# Patient Record
Sex: MALE | Race: BLACK OR AFRICAN AMERICAN | NOT HISPANIC OR LATINO | Employment: OTHER | URBAN - METROPOLITAN AREA
[De-identification: names, ages, dates, MRNs, and addresses within clinical notes are randomized per-mention and may not be internally consistent; named-entity substitution may affect disease eponyms.]

---

## 2023-02-24 LAB
ANION GAP IN SER/PLAS: 15 MMOL/L (ref 10–20)
CALCIUM (MG/DL) IN SER/PLAS: 9.8 MG/DL (ref 8.6–10.6)
CARBON DIOXIDE, TOTAL (MMOL/L) IN SER/PLAS: 30 MMOL/L (ref 21–32)
CHLORIDE (MMOL/L) IN SER/PLAS: 101 MMOL/L (ref 98–107)
COBALAMIN (VITAMIN B12) (PG/ML) IN SER/PLAS: 632 PG/ML (ref 211–911)
CREATININE (MG/DL) IN SER/PLAS: 1.85 MG/DL (ref 0.5–1.3)
ERYTHROCYTE DISTRIBUTION WIDTH (RATIO) BY AUTOMATED COUNT: 14.3 % (ref 11.5–14.5)
ERYTHROCYTE MEAN CORPUSCULAR HEMOGLOBIN CONCENTRATION (G/DL) BY AUTOMATED: 32.3 G/DL (ref 32–36)
ERYTHROCYTE MEAN CORPUSCULAR VOLUME (FL) BY AUTOMATED COUNT: 89 FL (ref 80–100)
ERYTHROCYTES (10*6/UL) IN BLOOD BY AUTOMATED COUNT: 5.51 X10E12/L (ref 4.5–5.9)
GFR MALE: 36 ML/MIN/1.73M2
GLUCOSE (MG/DL) IN SER/PLAS: 68 MG/DL (ref 74–99)
HEMATOCRIT (%) IN BLOOD BY AUTOMATED COUNT: 48.9 % (ref 41–52)
HEMOGLOBIN (G/DL) IN BLOOD: 15.8 G/DL (ref 13.5–17.5)
LEUKOCYTES (10*3/UL) IN BLOOD BY AUTOMATED COUNT: 6.2 X10E9/L (ref 4.4–11.3)
NRBC (PER 100 WBCS) BY AUTOMATED COUNT: 0 /100 WBC (ref 0–0)
PLATELETS (10*3/UL) IN BLOOD AUTOMATED COUNT: 286 X10E9/L (ref 150–450)
POTASSIUM (MMOL/L) IN SER/PLAS: 4.6 MMOL/L (ref 3.5–5.3)
SODIUM (MMOL/L) IN SER/PLAS: 141 MMOL/L (ref 136–145)
THYROTROPIN (MIU/L) IN SER/PLAS BY DETECTION LIMIT <= 0.05 MIU/L: 1.03 MIU/L (ref 0.44–3.98)
UREA NITROGEN (MG/DL) IN SER/PLAS: 11 MG/DL (ref 6–23)

## 2023-03-09 PROBLEM — E55.9 VITAMIN D DEFICIENCY: Status: ACTIVE | Noted: 2023-03-09

## 2023-03-09 PROBLEM — R53.1 WEAKNESS GENERALIZED: Status: ACTIVE | Noted: 2023-03-09

## 2023-03-09 PROBLEM — H40.009 GLAUCOMA SUSPECT: Status: ACTIVE | Noted: 2023-03-09

## 2023-03-09 PROBLEM — J34.89 NASAL OBSTRUCTION: Status: ACTIVE | Noted: 2023-03-09

## 2023-03-09 PROBLEM — F10.27 DEMENTIA ASSOCIATED WITH ALCOHOLISM WITHOUT BEHAVIORAL DISTURBANCE (MULTI): Status: ACTIVE | Noted: 2023-03-09

## 2023-03-09 PROBLEM — E53.8 VITAMIN B12 DEFICIENCY: Status: ACTIVE | Noted: 2023-03-09

## 2023-03-09 PROBLEM — N18.30 CKD (CHRONIC KIDNEY DISEASE) STAGE 3, GFR 30-59 ML/MIN (MULTI): Status: ACTIVE | Noted: 2023-03-09

## 2023-03-09 PROBLEM — Z96.1 PSEUDOPHAKIA OF LEFT EYE: Status: ACTIVE | Noted: 2023-03-09

## 2023-03-09 PROBLEM — I45.9 SKIPPED HEART BEATS: Status: ACTIVE | Noted: 2023-03-09

## 2023-03-09 PROBLEM — T44.6X5A TAMSULOSIN-ASSOCIATED FLOPPY IRIS: Status: ACTIVE | Noted: 2023-03-09

## 2023-03-09 PROBLEM — Z96.1 PSEUDOPHAKIA OF RIGHT EYE: Status: ACTIVE | Noted: 2023-03-09

## 2023-03-09 PROBLEM — R06.89 DIFFICULTY BREATHING: Status: ACTIVE | Noted: 2023-03-09

## 2023-03-09 PROBLEM — R41.3 MEMORY IMPAIRMENT: Status: ACTIVE | Noted: 2023-03-09

## 2023-03-09 PROBLEM — H21.81 TAMSULOSIN-ASSOCIATED FLOPPY IRIS: Status: ACTIVE | Noted: 2023-03-09

## 2023-03-09 PROBLEM — N40.0 ENLARGED PROSTATE WITHOUT LOWER URINARY TRACT SYMPTOMS (LUTS): Status: ACTIVE | Noted: 2023-03-09

## 2023-03-09 PROBLEM — J31.0 CHRONIC RHINITIS: Status: ACTIVE | Noted: 2023-03-09

## 2023-03-09 PROBLEM — J34.89 NASAL DRAINAGE: Status: ACTIVE | Noted: 2023-03-09

## 2023-03-09 PROBLEM — N28.9 RENAL INSUFFICIENCY: Status: ACTIVE | Noted: 2023-03-09

## 2023-03-09 PROBLEM — E78.00 HYPERCHOLESTEROLEMIA: Status: ACTIVE | Noted: 2023-03-09

## 2023-03-09 PROBLEM — L30.9 DERMATITIS: Status: ACTIVE | Noted: 2023-03-09

## 2023-03-09 PROBLEM — N43.3 HYDROCELE: Status: ACTIVE | Noted: 2023-03-09

## 2023-03-09 PROBLEM — H25.812 COMBINED FORMS OF AGE-RELATED CATARACT OF LEFT EYE: Status: ACTIVE | Noted: 2023-03-09

## 2023-03-09 PROBLEM — J30.9 ALLERGIC RHINITIS: Status: ACTIVE | Noted: 2023-03-09

## 2023-03-09 PROBLEM — L98.9 LESION OF SKIN OF FACE: Status: ACTIVE | Noted: 2023-03-09

## 2023-03-09 PROBLEM — F41.9 ANXIETY: Status: ACTIVE | Noted: 2023-03-09

## 2023-03-09 PROBLEM — G31.84 MILD COGNITIVE IMPAIRMENT: Status: ACTIVE | Noted: 2023-03-09

## 2023-03-09 PROBLEM — N18.32 CKD STAGE G3B/A1, GFR 30-44 AND ALBUMIN CREATININE RATIO <30 MG/G (MULTI): Status: ACTIVE | Noted: 2023-03-09

## 2023-03-09 PROBLEM — R42 DIZZINESS: Status: ACTIVE | Noted: 2023-03-09

## 2023-03-09 PROBLEM — H40.9 GLAUCOMA OF BOTH EYES: Status: ACTIVE | Noted: 2023-03-09

## 2023-03-09 PROBLEM — S76.019A MUSCLE STRAIN OF GLUTEAL REGION, INITIAL ENCOUNTER: Status: ACTIVE | Noted: 2023-03-09

## 2023-03-09 PROBLEM — E78.5 HYPERLIPIDEMIA: Status: ACTIVE | Noted: 2023-03-09

## 2023-03-09 PROBLEM — J34.3 HYPERTROPHY OF INFERIOR NASAL TURBINATE: Status: ACTIVE | Noted: 2023-03-09

## 2023-03-09 PROBLEM — F03.90 DEMENTIA (MULTI): Status: ACTIVE | Noted: 2023-03-09

## 2023-03-09 PROBLEM — R79.89 ELEVATED SERUM CREATININE: Status: ACTIVE | Noted: 2023-03-09

## 2023-03-09 PROBLEM — R33.9 INCOMPLETE BLADDER EMPTYING: Status: ACTIVE | Noted: 2023-03-09

## 2023-03-09 PROBLEM — R31.29 MICROSCOPIC HEMATURIA: Status: ACTIVE | Noted: 2023-03-09

## 2023-03-09 PROBLEM — F10.90 CHRONIC ALCOHOL USE: Status: ACTIVE | Noted: 2023-03-09

## 2023-03-09 PROBLEM — J34.2 DEVIATED NASAL SEPTUM: Status: ACTIVE | Noted: 2023-03-09

## 2023-03-09 PROBLEM — H40.1192 GLAUCOMA SIMPLEX, MODERATE STAGE: Status: ACTIVE | Noted: 2023-03-09

## 2023-03-09 PROBLEM — I10 BENIGN ESSENTIAL HYPERTENSION: Status: ACTIVE | Noted: 2023-03-09

## 2023-03-09 RX ORDER — ATORVASTATIN CALCIUM 10 MG/1
1 TABLET, FILM COATED ORAL NIGHTLY
COMMUNITY
Start: 2018-09-13 | End: 2023-08-22

## 2023-03-09 RX ORDER — IPRATROPIUM BROMIDE 21 UG/1
SPRAY, METERED NASAL
COMMUNITY
Start: 2022-06-15

## 2023-03-09 RX ORDER — VITAMIN B COMPLEX
TABLET ORAL
COMMUNITY
Start: 2020-06-22 | End: 2023-10-27 | Stop reason: SDUPTHER

## 2023-03-09 RX ORDER — ASCORBIC ACID 250 MG
TABLET ORAL
COMMUNITY

## 2023-03-09 RX ORDER — LABETALOL 100 MG/1
200 TABLET, FILM COATED ORAL EVERY MORNING
COMMUNITY
Start: 2013-02-28 | End: 2023-08-22

## 2023-03-09 RX ORDER — TAMSULOSIN HYDROCHLORIDE 0.4 MG/1
1 CAPSULE ORAL NIGHTLY
COMMUNITY
Start: 2015-10-05

## 2023-03-09 RX ORDER — FOLIC ACID 0.8 MG
1 TABLET ORAL DAILY
COMMUNITY
Start: 2020-06-22

## 2023-03-09 RX ORDER — AZELASTINE HYDROCHLORIDE 0.5 MG/ML
SOLUTION/ DROPS OPHTHALMIC
COMMUNITY
Start: 2017-06-08

## 2023-03-09 RX ORDER — AMLODIPINE BESYLATE 10 MG/1
1 TABLET ORAL DAILY
COMMUNITY
Start: 2020-08-10 | End: 2023-08-23 | Stop reason: SDUPTHER

## 2023-03-09 RX ORDER — MONTELUKAST SODIUM 10 MG/1
10 TABLET ORAL NIGHTLY
COMMUNITY
Start: 2017-08-10

## 2023-03-09 RX ORDER — CETIRIZINE HYDROCHLORIDE 10 MG/1
10 TABLET ORAL 2 TIMES DAILY
COMMUNITY
Start: 2022-06-15

## 2023-03-09 RX ORDER — ZINC SULFATE 50(220)MG
CAPSULE ORAL
COMMUNITY
End: 2023-10-27

## 2023-03-09 RX ORDER — BRIMONIDINE TARTRATE 2 MG/ML
SOLUTION/ DROPS OPHTHALMIC EVERY EVENING
COMMUNITY
End: 2023-10-27

## 2023-03-09 RX ORDER — CYANOCOBALAMIN 1000 UG/ML
1000 INJECTION, SOLUTION INTRAMUSCULAR; SUBCUTANEOUS
COMMUNITY
Start: 2014-06-17 | End: 2023-10-27

## 2023-03-14 ENCOUNTER — OFFICE VISIT (OUTPATIENT)
Dept: PRIMARY CARE | Facility: CLINIC | Age: 83
End: 2023-03-14
Payer: MEDICARE

## 2023-03-14 VITALS
DIASTOLIC BLOOD PRESSURE: 79 MMHG | OXYGEN SATURATION: 97 % | HEIGHT: 74 IN | HEART RATE: 73 BPM | BODY MASS INDEX: 30.8 KG/M2 | WEIGHT: 240 LBS | SYSTOLIC BLOOD PRESSURE: 136 MMHG

## 2023-03-14 DIAGNOSIS — R22.2 SUBCUTANEOUS MASS OF BACK: Primary | ICD-10-CM

## 2023-03-14 PROCEDURE — 1036F TOBACCO NON-USER: CPT | Performed by: STUDENT IN AN ORGANIZED HEALTH CARE EDUCATION/TRAINING PROGRAM

## 2023-03-14 PROCEDURE — 3075F SYST BP GE 130 - 139MM HG: CPT | Performed by: STUDENT IN AN ORGANIZED HEALTH CARE EDUCATION/TRAINING PROGRAM

## 2023-03-14 PROCEDURE — 3078F DIAST BP <80 MM HG: CPT | Performed by: STUDENT IN AN ORGANIZED HEALTH CARE EDUCATION/TRAINING PROGRAM

## 2023-03-14 PROCEDURE — 1159F MED LIST DOCD IN RCRD: CPT | Performed by: STUDENT IN AN ORGANIZED HEALTH CARE EDUCATION/TRAINING PROGRAM

## 2023-03-14 PROCEDURE — 99496 TRANSJ CARE MGMT HIGH F2F 7D: CPT | Performed by: STUDENT IN AN ORGANIZED HEALTH CARE EDUCATION/TRAINING PROGRAM

## 2023-03-14 NOTE — PROGRESS NOTES
"Subjective   Patient ID: Rambo Wong is a 83 y.o. male who presents for Hospital Follow-up (Hospital follow-up. /79).    [facility] dc fu  Is here with his wife   Was hospitalized for dizziness   Work up was negative for arrhythmias and CVA   Likely alcohol induced . Consumed excess until he quit 6 years go s/p Neuro consult OP   Vestibular therapy referral     Incidental finding  of ?lipsarcoma     Review of Systems    Constitutional : No feeling poorly / fevers/ chills / night sweats/ fatigue   Cardiovascular : No CP /Palpitations/ lower extremity edema / syncope   Respiratory : No Cough /BEAULIEU/Dyspnea at rest     CNS: No confusion / HA/ tingling/ numbness/ weakness of extremities  Dizziness+   Psychiatric: No anxiety/ depression/ SI/HI    All other systems have been reviewed and are negative for complaint   Objective   Pulse 73   Ht 1.88 m (6' 2\")   Wt 109 kg (240 lb)   SpO2 97%   BMI 30.81 kg/m²     Physical Exam    Constitutional : Vitals reviewed. Alert and in no distress  Cardiovascular : RRR, Normal S1, S2, No pericardial rub/ gallop, no peripheral edema   Pulmonary: No respiratory distress, CTAB   MSK : Normal gait and station , strength and tone     Neurologic : CNs 2-12 grossly intact , no obvious FNDs  Psych : A,Ox3, normal mood and affect      Assessment/Plan     84 y/o male with h/o alcohol abuse (sober since the last 3 years as of june 2020 ) , HTN, HPL, alcohol induced dementia , former smoker with 40 pk year history   Is here with his wife      Hospital course, labs and imaging reports reviewed . Med reconciliation done . F/u labs/Imaging, if needed were ordered .     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

## 2023-03-17 ENCOUNTER — TELEPHONE (OUTPATIENT)
Dept: PRIMARY CARE | Facility: CLINIC | Age: 83
End: 2023-03-17

## 2023-04-06 ENCOUNTER — DOCUMENTATION (OUTPATIENT)
Dept: PRIMARY CARE | Facility: CLINIC | Age: 83
End: 2023-04-06
Payer: MEDICARE

## 2023-06-16 ENCOUNTER — TELEPHONE (OUTPATIENT)
Dept: PRIMARY CARE | Facility: CLINIC | Age: 83
End: 2023-06-16

## 2023-06-16 ENCOUNTER — OFFICE VISIT (OUTPATIENT)
Dept: PRIMARY CARE | Facility: CLINIC | Age: 83
End: 2023-06-16
Payer: MEDICARE

## 2023-06-16 VITALS
DIASTOLIC BLOOD PRESSURE: 67 MMHG | WEIGHT: 234.6 LBS | OXYGEN SATURATION: 97 % | HEART RATE: 72 BPM | SYSTOLIC BLOOD PRESSURE: 129 MMHG | BODY MASS INDEX: 30.12 KG/M2

## 2023-06-16 DIAGNOSIS — J98.01 BRONCHOSPASM: Primary | ICD-10-CM

## 2023-06-16 DIAGNOSIS — I10 BENIGN ESSENTIAL HYPERTENSION: ICD-10-CM

## 2023-06-16 DIAGNOSIS — F10.27 DEMENTIA ASSOCIATED WITH ALCOHOLISM WITHOUT BEHAVIORAL DISTURBANCE (MULTI): ICD-10-CM

## 2023-06-16 DIAGNOSIS — N18.32 STAGE 3B CHRONIC KIDNEY DISEASE (MULTI): ICD-10-CM

## 2023-06-16 DIAGNOSIS — R73.03 PREDIABETES: ICD-10-CM

## 2023-06-16 PROCEDURE — 3078F DIAST BP <80 MM HG: CPT | Performed by: STUDENT IN AN ORGANIZED HEALTH CARE EDUCATION/TRAINING PROGRAM

## 2023-06-16 PROCEDURE — 1160F RVW MEDS BY RX/DR IN RCRD: CPT | Performed by: STUDENT IN AN ORGANIZED HEALTH CARE EDUCATION/TRAINING PROGRAM

## 2023-06-16 PROCEDURE — 1036F TOBACCO NON-USER: CPT | Performed by: STUDENT IN AN ORGANIZED HEALTH CARE EDUCATION/TRAINING PROGRAM

## 2023-06-16 PROCEDURE — 3074F SYST BP LT 130 MM HG: CPT | Performed by: STUDENT IN AN ORGANIZED HEALTH CARE EDUCATION/TRAINING PROGRAM

## 2023-06-16 PROCEDURE — 1159F MED LIST DOCD IN RCRD: CPT | Performed by: STUDENT IN AN ORGANIZED HEALTH CARE EDUCATION/TRAINING PROGRAM

## 2023-06-16 PROCEDURE — 99213 OFFICE O/P EST LOW 20 MIN: CPT | Performed by: STUDENT IN AN ORGANIZED HEALTH CARE EDUCATION/TRAINING PROGRAM

## 2023-06-16 RX ORDER — ALBUTEROL SULFATE 90 UG/1
2 AEROSOL, METERED RESPIRATORY (INHALATION) EVERY 4 HOURS PRN
Qty: 8.5 G | Refills: 3 | OUTPATIENT
Start: 2023-06-16 | End: 2024-05-23

## 2023-06-16 RX ORDER — AZELASTINE 1 MG/ML
1 SPRAY, METERED NASAL 2 TIMES DAILY
COMMUNITY

## 2023-06-16 ASSESSMENT — PATIENT HEALTH QUESTIONNAIRE - PHQ9
2. FEELING DOWN, DEPRESSED OR HOPELESS: NOT AT ALL
SUM OF ALL RESPONSES TO PHQ9 QUESTIONS 1 AND 2: 0
1. LITTLE INTEREST OR PLEASURE IN DOING THINGS: NOT AT ALL

## 2023-06-16 NOTE — TELEPHONE ENCOUNTER
Pt.'s wife states forgot to ask you for a nebulizer for pt.s breathing problem. Can you please send to [facility] on file.

## 2023-06-19 DIAGNOSIS — J98.01 BRONCHOSPASM: Primary | ICD-10-CM

## 2023-06-19 RX ORDER — ALBUTEROL SULFATE 0.83 MG/ML
2.5 SOLUTION RESPIRATORY (INHALATION) 4 TIMES DAILY PRN
Qty: 30 ML | Refills: 1 | Status: SHIPPED | OUTPATIENT
Start: 2023-06-19 | End: 2023-10-27

## 2023-06-19 RX ORDER — NEBULIZER AND COMPRESSOR
1 EACH MISCELLANEOUS 4 TIMES DAILY PRN
Qty: 1 EACH | Refills: 0 | Status: SHIPPED | OUTPATIENT
Start: 2023-06-19 | End: 2023-10-27

## 2023-06-28 ENCOUNTER — HOSPITAL ENCOUNTER (OUTPATIENT)
Dept: DATA CONVERSION | Facility: HOSPITAL | Age: 83
End: 2023-06-28
Attending: SURGERY | Admitting: SURGERY
Payer: MEDICARE

## 2023-06-28 DIAGNOSIS — R22.2 LOCALIZED SWELLING, MASS AND LUMP, TRUNK: ICD-10-CM

## 2023-06-28 DIAGNOSIS — G47.33 OBSTRUCTIVE SLEEP APNEA (ADULT) (PEDIATRIC): ICD-10-CM

## 2023-06-28 DIAGNOSIS — I12.9 HYPERTENSIVE CHRONIC KIDNEY DISEASE WITH STAGE 1 THROUGH STAGE 4 CHRONIC KIDNEY DISEASE, OR UNSPECIFIED CHRONIC KIDNEY DISEASE: ICD-10-CM

## 2023-06-28 DIAGNOSIS — Z87.891 PERSONAL HISTORY OF NICOTINE DEPENDENCE: ICD-10-CM

## 2023-06-28 DIAGNOSIS — N18.30 CHRONIC KIDNEY DISEASE, STAGE 3 UNSPECIFIED (MULTI): ICD-10-CM

## 2023-06-28 DIAGNOSIS — F03.90 UNSPECIFIED DEMENTIA, UNSPECIFIED SEVERITY, WITHOUT BEHAVIORAL DISTURBANCE, PSYCHOTIC DISTURBANCE, MOOD DISTURBANCE, AND ANXIETY (MULTI): ICD-10-CM

## 2023-06-28 DIAGNOSIS — D17.1 BENIGN LIPOMATOUS NEOPLASM OF SKIN AND SUBCUTANEOUS TISSUE OF TRUNK: ICD-10-CM

## 2023-06-28 DIAGNOSIS — E78.5 HYPERLIPIDEMIA, UNSPECIFIED: ICD-10-CM

## 2023-08-01 LAB
COMPLETE PATHOLOGY REPORT: NORMAL
CONVERTED CLINICAL DIAGNOSIS-HISTORY: NORMAL
CONVERTED FINAL DIAGNOSIS: NORMAL
CONVERTED FINAL REPORT PDF LINK TO COPY AND PASTE: NORMAL
CONVERTED GROSS DESCRIPTION: NORMAL

## 2023-08-11 DIAGNOSIS — E78.5 HYPERLIPIDEMIA, UNSPECIFIED HYPERLIPIDEMIA TYPE: ICD-10-CM

## 2023-08-11 DIAGNOSIS — I10 BENIGN ESSENTIAL HYPERTENSION: ICD-10-CM

## 2023-08-21 PROBLEM — R22.2 MASS ON BACK: Status: ACTIVE | Noted: 2023-08-21

## 2023-08-21 PROBLEM — R97.20 ELEVATED PSA: Status: ACTIVE | Noted: 2023-08-21

## 2023-08-21 PROBLEM — N40.1 BPH ASSOCIATED WITH NOCTURIA: Status: ACTIVE | Noted: 2023-08-21

## 2023-08-21 PROBLEM — G47.33 OSA (OBSTRUCTIVE SLEEP APNEA): Status: ACTIVE | Noted: 2023-08-21

## 2023-08-21 PROBLEM — R35.1 BPH ASSOCIATED WITH NOCTURIA: Status: ACTIVE | Noted: 2023-08-21

## 2023-08-21 PROBLEM — J06.9 URI (UPPER RESPIRATORY INFECTION): Status: ACTIVE | Noted: 2023-08-21

## 2023-08-21 PROBLEM — R07.9 CHEST PAIN: Status: ACTIVE | Noted: 2023-08-21

## 2023-08-21 PROBLEM — G47.10 HYPERSOMNOLENCE: Status: ACTIVE | Noted: 2023-08-21

## 2023-08-21 PROBLEM — R53.81 PHYSICAL DECONDITIONING: Status: ACTIVE | Noted: 2023-08-21

## 2023-08-21 PROBLEM — R42 VERTIGO: Status: ACTIVE | Noted: 2023-08-21

## 2023-08-21 PROBLEM — R06.02 SHORTNESS OF BREATH: Status: ACTIVE | Noted: 2023-08-21

## 2023-08-21 PROBLEM — H21.81 FLOPPY IRIS SYNDROME: Status: ACTIVE | Noted: 2023-08-21

## 2023-08-21 RX ORDER — IPRATROPIUM BROMIDE AND ALBUTEROL SULFATE 2.5; .5 MG/3ML; MG/3ML
3 SOLUTION RESPIRATORY (INHALATION) 4 TIMES DAILY
COMMUNITY
End: 2023-10-27

## 2023-08-21 RX ORDER — ACETAMINOPHEN 500 MG
1 TABLET ORAL DAILY
COMMUNITY

## 2023-08-21 RX ORDER — FLUTICASONE PROPIONATE 50 MCG
2 SPRAY, SUSPENSION (ML) NASAL DAILY
COMMUNITY
Start: 2023-06-15

## 2023-08-21 RX ORDER — MELATON/THEAN/VAL/LEM/CHAM/LAV 10MG-200MG
1 TABLET,IMMED, EXTENDED RELEASE, BIPHASIC ORAL DAILY
COMMUNITY
End: 2023-10-27 | Stop reason: SDUPTHER

## 2023-08-21 RX ORDER — TRAMADOL HYDROCHLORIDE 50 MG/1
50 TABLET ORAL EVERY 6 HOURS PRN
COMMUNITY
Start: 2023-06-28 | End: 2023-10-27

## 2023-08-21 RX ORDER — SYRINGE-NEEDLE,INSULIN,0.5 ML 28GX1/2"
600 SYRINGE, EMPTY DISPOSABLE MISCELLANEOUS 2 TIMES DAILY
COMMUNITY
Start: 2023-06-12

## 2023-08-22 RX ORDER — ATORVASTATIN CALCIUM 10 MG/1
10 TABLET, FILM COATED ORAL NIGHTLY
Qty: 90 TABLET | Refills: 3 | Status: SHIPPED | OUTPATIENT
Start: 2023-08-22 | End: 2023-08-23 | Stop reason: SDUPTHER

## 2023-08-22 RX ORDER — LABETALOL 100 MG/1
TABLET, FILM COATED ORAL
Qty: 90 TABLET | Refills: 3 | Status: SHIPPED | OUTPATIENT
Start: 2023-08-22 | End: 2023-08-23 | Stop reason: SDUPTHER

## 2023-08-23 ENCOUNTER — OFFICE VISIT (OUTPATIENT)
Dept: PRIMARY CARE | Facility: CLINIC | Age: 83
End: 2023-08-23
Payer: MEDICARE

## 2023-08-23 VITALS
BODY MASS INDEX: 31.18 KG/M2 | HEART RATE: 95 BPM | SYSTOLIC BLOOD PRESSURE: 133 MMHG | DIASTOLIC BLOOD PRESSURE: 78 MMHG | HEIGHT: 74 IN | WEIGHT: 243 LBS | OXYGEN SATURATION: 97 %

## 2023-08-23 DIAGNOSIS — E78.2 MIXED HYPERLIPIDEMIA: ICD-10-CM

## 2023-08-23 DIAGNOSIS — I10 BENIGN ESSENTIAL HYPERTENSION: ICD-10-CM

## 2023-08-23 DIAGNOSIS — R73.03 PREDIABETES: ICD-10-CM

## 2023-08-23 DIAGNOSIS — Z12.5 SCREENING FOR PROSTATE CANCER: ICD-10-CM

## 2023-08-23 DIAGNOSIS — E78.5 HYPERLIPIDEMIA, UNSPECIFIED HYPERLIPIDEMIA TYPE: ICD-10-CM

## 2023-08-23 DIAGNOSIS — Z00.00 MEDICARE ANNUAL WELLNESS VISIT, SUBSEQUENT: Primary | ICD-10-CM

## 2023-08-23 DIAGNOSIS — I10 PRIMARY HYPERTENSION: ICD-10-CM

## 2023-08-23 DIAGNOSIS — N18.32 STAGE 3B CHRONIC KIDNEY DISEASE (MULTI): ICD-10-CM

## 2023-08-23 LAB
CHOLESTEROL (MG/DL) IN SER/PLAS: 146 MG/DL (ref 0–199)
CHOLESTEROL IN HDL (MG/DL) IN SER/PLAS: 39 MG/DL
CHOLESTEROL/HDL RATIO: 3.7
ESTIMATED AVERAGE GLUCOSE FOR HBA1C: 111 MG/DL
HEMOGLOBIN A1C/HEMOGLOBIN TOTAL IN BLOOD: 5.5 %
LDL: 84 MG/DL (ref 0–99)
TRIGLYCERIDE (MG/DL) IN SER/PLAS: 114 MG/DL (ref 0–149)
VLDL: 23 MG/DL (ref 0–40)

## 2023-08-23 PROCEDURE — 1170F FXNL STATUS ASSESSED: CPT | Performed by: STUDENT IN AN ORGANIZED HEALTH CARE EDUCATION/TRAINING PROGRAM

## 2023-08-23 PROCEDURE — 1036F TOBACCO NON-USER: CPT | Performed by: STUDENT IN AN ORGANIZED HEALTH CARE EDUCATION/TRAINING PROGRAM

## 2023-08-23 PROCEDURE — 83036 HEMOGLOBIN GLYCOSYLATED A1C: CPT

## 2023-08-23 PROCEDURE — 80061 LIPID PANEL: CPT

## 2023-08-23 PROCEDURE — 1126F AMNT PAIN NOTED NONE PRSNT: CPT | Performed by: STUDENT IN AN ORGANIZED HEALTH CARE EDUCATION/TRAINING PROGRAM

## 2023-08-23 PROCEDURE — 1159F MED LIST DOCD IN RCRD: CPT | Performed by: STUDENT IN AN ORGANIZED HEALTH CARE EDUCATION/TRAINING PROGRAM

## 2023-08-23 PROCEDURE — 1160F RVW MEDS BY RX/DR IN RCRD: CPT | Performed by: STUDENT IN AN ORGANIZED HEALTH CARE EDUCATION/TRAINING PROGRAM

## 2023-08-23 PROCEDURE — G0439 PPPS, SUBSEQ VISIT: HCPCS | Performed by: STUDENT IN AN ORGANIZED HEALTH CARE EDUCATION/TRAINING PROGRAM

## 2023-08-23 PROCEDURE — 3075F SYST BP GE 130 - 139MM HG: CPT | Performed by: STUDENT IN AN ORGANIZED HEALTH CARE EDUCATION/TRAINING PROGRAM

## 2023-08-23 PROCEDURE — 99214 OFFICE O/P EST MOD 30 MIN: CPT | Performed by: STUDENT IN AN ORGANIZED HEALTH CARE EDUCATION/TRAINING PROGRAM

## 2023-08-23 PROCEDURE — 3078F DIAST BP <80 MM HG: CPT | Performed by: STUDENT IN AN ORGANIZED HEALTH CARE EDUCATION/TRAINING PROGRAM

## 2023-08-23 RX ORDER — LABETALOL 100 MG/1
TABLET, FILM COATED ORAL
Qty: 90 TABLET | Refills: 3 | Status: SHIPPED | OUTPATIENT
Start: 2023-08-23 | End: 2024-01-12

## 2023-08-23 RX ORDER — AMLODIPINE BESYLATE 10 MG/1
10 TABLET ORAL DAILY
Qty: 90 TABLET | Refills: 3 | Status: SHIPPED | OUTPATIENT
Start: 2023-08-23 | End: 2024-05-09

## 2023-08-23 RX ORDER — ATORVASTATIN CALCIUM 10 MG/1
10 TABLET, FILM COATED ORAL NIGHTLY
Qty: 90 TABLET | Refills: 3 | Status: SHIPPED | OUTPATIENT
Start: 2023-08-23

## 2023-08-23 ASSESSMENT — ACTIVITIES OF DAILY LIVING (ADL)
MANAGING_FINANCES: TOTAL CARE
GROCERY_SHOPPING: TOTAL CARE
DOING_HOUSEWORK: NEEDS ASSISTANCE
DRESSING: INDEPENDENT
TAKING_MEDICATION: NEEDS ASSISTANCE
BATHING: INDEPENDENT

## 2023-08-23 NOTE — PROGRESS NOTES
Subjective   Reason for Visit: Rambo Wong is an 83 y.o. male here for a Medicare Wellness visit.     Past Medical, Surgical, and Family History reviewed and updated in chart.    Reviewed all medications by prescribing practitioner or clinical pharmacist (such as prescriptions, OTCs, herbal therapies and supplements) and documented in the medical record.    HPI  84 y/o male with h/o alcohol abuse (sober since the last 3 years as of june 2020 ) , HTN, HPL, alcohol induced dementia , former smoker with 40 pk year history     Patient Care Team:  Sahra De Leon MD as PCP - General  Sahra De Leon MD as PCP - [facility] Attributed Provider     Review of Systems    Constitutional: no chills, no fever and no night sweats.     Eyes: no blurred vision and no eyesight problems.     ENT: no hearing loss, no nasal congestion, no nasal discharge, no hoarseness and no sore throat.     Cardiovascular: no chest pain, no intermittent leg claudication, no lower extremity edema, no palpitations and no syncope.     Respiratory: no cough, no shortness of breath during exertion, no shortness of breath at rest and no wheezing.     Gastrointestinal: no abdominal pain, no constipation, no blood in stools, no diarrhea, no melena, no nausea, no rectal pain and no vomiting.     Genitourinary: no dysuria, no change in urinary frequency, no urinary hesitancy and no feelings of urinary urgency.     Musculoskeletal: no arthralgias, no back pain and no myalgias.     Integumentary: no new skin lesions and no rashes.     Neurological: no difficulty walking, no headache, no limb weakness, no numbness and no tingling.     Psychiatric: no anxiety, no depression, no anhedonia and no substance use disorders.     Endocrine: no recent weight gain and no recent weight loss.     Hematologic/Lymphatic: no tendency for easy bruising and no swollen glands.          All other systems have been reviewed and are negative for complaint.    Objective  "  Vitals:  /78   Pulse 95   Ht 1.88 m (6' 2\")   Wt 110 kg (243 lb)   SpO2 97%   BMI 31.20 kg/m²       Physical Exam    Constitutional: Alert and in no acute distress. Well developed, well nourished.     Eyes: Normal external exam. Pupils were equal in size, round, reactive to light (PERRL) with normal accommodation and extraocular movements intact (EOMI).     Ears, Nose, Mouth, and Throat: External inspection of ears and nose: Normal.  Otoscopic examination: Normal.      Neck: No neck mass was observed. Supple.     Cardiovascular: Heart rate and rhythm were normal, normal S1 and S2, no gallops, no murmurs and no pericardial rub    Pulmonary: No respiratory distress. Clear bilateral breath sounds.     Abdomen: Soft nontender; no abdominal mass palpated. No organomegaly.     Musculoskeletal: No joint swelling seen, normal movements of all extremities. Range of motion: Normal.  Muscle strength/tone: Normal.      Neurologic: Deep tendon reflexes were 2+ and symmetric. Sensation: Normal.     Psychiatric: Judgment and insight: Intact. Mood and affect: Normal.      Assessment/Plan   Problem List Items Addressed This Visit       Benign essential hypertension    Relevant Medications    labetalol (Normodyne) 100 mg tablet    Hyperlipidemia    Relevant Medications    atorvastatin (Lipitor) 10 mg tablet     Other Visit Diagnoses       Medicare annual wellness visit, subsequent    -  Primary    Screening for prostate cancer        Primary hypertension        Relevant Medications    amLODIPine (Norvasc) 10 mg tablet          82 y/o male with h/o alcohol abuse (sober since the last 3 years as of june 2020 ) , HTN, HPL, alcohol induced dementia , former smoker with 40 pk year history  ( used to smoked 2.5 PPD  for 10 years, quit in 2002 approx )     Is here with his wife      Chronic medical conditions: Reviewed , assessed , stable.  - HTN  - HPL   - CKD stage 3 : est with Nephro      RTO in 6m or sooner if needed    "   **Patient Discussion/Summary       Influenza: influenza vaccine  , in the fall   Pneumovax 23/Prevnar 15: Pneumovax 23/Prevnar 15 vaccine was previously given.   Prevnar 20: Prevnar 20 vaccine was previously given.   Shingles Vaccine: Shingles vaccine was previously given.   Prostate cancer screening: Screening  ordered  Colorectal Cancer Screening: screening not indicated.   Abdominal Aortic Aneurysm screening: screening not indicated.   HIV screening: screening not indicated

## 2023-09-27 LAB
ALBUMIN (G/DL) IN SER/PLAS: 4.2 G/DL (ref 3.4–5)
ANION GAP IN SER/PLAS: 15 MMOL/L (ref 10–20)
CALCIDIOL (25 OH VITAMIN D3) (NG/ML) IN SER/PLAS: 39 NG/ML
CALCIUM (MG/DL) IN SER/PLAS: 10.3 MG/DL (ref 8.6–10.6)
CARBON DIOXIDE, TOTAL (MMOL/L) IN SER/PLAS: 31 MMOL/L (ref 21–32)
CHLORIDE (MMOL/L) IN SER/PLAS: 103 MMOL/L (ref 98–107)
CREATININE (MG/DL) IN SER/PLAS: 1.92 MG/DL (ref 0.5–1.3)
ERYTHROCYTE DISTRIBUTION WIDTH (RATIO) BY AUTOMATED COUNT: 15 % (ref 11.5–14.5)
ERYTHROCYTE MEAN CORPUSCULAR HEMOGLOBIN CONCENTRATION (G/DL) BY AUTOMATED: 31.4 G/DL (ref 32–36)
ERYTHROCYTE MEAN CORPUSCULAR VOLUME (FL) BY AUTOMATED COUNT: 90 FL (ref 80–100)
ERYTHROCYTES (10*6/UL) IN BLOOD BY AUTOMATED COUNT: 5.7 X10E12/L (ref 4.5–5.9)
GFR MALE: 34 ML/MIN/1.73M2
GLUCOSE (MG/DL) IN SER/PLAS: 101 MG/DL (ref 74–99)
HEMATOCRIT (%) IN BLOOD BY AUTOMATED COUNT: 51.3 % (ref 41–52)
HEMOGLOBIN (G/DL) IN BLOOD: 16.1 G/DL (ref 13.5–17.5)
LEUKOCYTES (10*3/UL) IN BLOOD BY AUTOMATED COUNT: 5.6 X10E9/L (ref 4.4–11.3)
NRBC (PER 100 WBCS) BY AUTOMATED COUNT: 0 /100 WBC (ref 0–0)
PARATHYRIN INTACT (PG/ML) IN SER/PLAS: 73.7 PG/ML (ref 18.5–88)
PHOSPHATE (MG/DL) IN SER/PLAS: 4.5 MG/DL (ref 2.5–4.9)
PLATELETS (10*3/UL) IN BLOOD AUTOMATED COUNT: 265 X10E9/L (ref 150–450)
POTASSIUM (MMOL/L) IN SER/PLAS: 4.5 MMOL/L (ref 3.5–5.3)
SODIUM (MMOL/L) IN SER/PLAS: 144 MMOL/L (ref 136–145)
UREA NITROGEN (MG/DL) IN SER/PLAS: 11 MG/DL (ref 6–23)

## 2023-09-29 VITALS
RESPIRATION RATE: 18 BRPM | SYSTOLIC BLOOD PRESSURE: 121 MMHG | TEMPERATURE: 97.5 F | BODY MASS INDEX: 30.22 KG/M2 | WEIGHT: 235.45 LBS | HEIGHT: 74 IN | DIASTOLIC BLOOD PRESSURE: 80 MMHG | HEART RATE: 72 BPM

## 2023-09-29 LAB — PROSTATE SPECIFIC AG (NG/ML) IN SER/PLAS: 3.56 NG/ML (ref 0–4)

## 2023-09-30 NOTE — H&P
History of Present Illness:   History Present Illness:  Reason for surgery: Right back atypical lipomatous  tumor   HPI:     Mr. Wong is a 83-year-old male presenting with a right upper back lipomatous mass that has been  present for years and may have increased in size. The patient was evaluated recently for mental status change and underwent a  CTA of the neck that revealed the right upper back mass. This is subcutaneous and large with a greatest dimension of 8 cm. There  is an area of hemorrhage versus soft tissue nodularity. Due to this finding he was referred for evaluation. Patient has dementia and  otherwise is very functional.    Past medical history:  Chronic kidney disease  Dementia  BPH  Cataracts  Fecal urgency  History of tobacco use    Past surgical history:  Cataract surgery 5 years ago  Bilateral knee surgeries  Umbilical hernia repair    Family History:  Sister with breast cancer. No other history of cancer in the family    Social history:  Lives with his wife in [place]. Quit tobacco use 20 years ago. Denies alcohol and drug use    Allergies:  No known drug allergies    ROS:  The patient has good performance status and is active daily.  Cardiac: No chest pain, palpitations or heart attacks  Pulmonary: No asthma, bronchitis, or COPD  HEENT: No sinus or dental issues.  GI: Fecal urgency  : bph  Musculoskeletal: No limitations to ROM or strength. Back mass  Skin: No prior skin lesions or concerns  Heme: No bleeding or thrombosis issues  Lymph: No swollen lymph glands  Psych: No reported anxiety or depression. Dementia  All other systems reviewed and negative    Physical exam:  General: No acute distress. Pleasant  HEENT: Moist oral mucosa, normocephalic  CV: RRR, Vitals reviewed  Pulmonary: No respiratory distress. No use of accessory muscles. No audible wheeze  Skin: Large 8-10cm soft mass on the right upper back. mobile. no palpable nodularity  Neuro: No gross sensorimotor  deficits  Extremities: No leg swelling    Allergies:        Allergies:  ·  No Known Allergies :     Home Medication Review:   Home Medications Reviewed: yes     Impression/Procedure:   ·  Impression and Planned Procedure: Resection right back lipomatous tumor       ERAS (Enhanced Recovery After Surgery):  ·  ERAS Patient: no       Vital Signs:  Temperature C: 36.4 degrees C   Temperature F: 97.5 degrees F   Heart Rate: 72 beats per minute   Respiratory Rate: 18 breath per minute   Blood Pressure Systolic: 121 mm/Hg   Blood Pressure Diastolic: 80 mm/Hg     Physical Exam by System:    Respiratory/Thorax: No respiratory distress   Cardiovascular: RRR     Consent:   COVID-19 Consent:  ·  COVID-19 Risk Consent Surgeon has reviewed key risks related to the risk of caridad COVID-19 and if they contract COVID-19 what the risks are.       Electronic Signatures:  Ashwin Mead)  (Signed 28-Jun-2023 08:15)   Authored: History of Present Illness, Allergies, Home  Medication Review, Impression/Procedure, ERAS, Physical Exam, Consent, Note Completion      Last Updated: 28-Jun-2023 08:15 by Ashwin Mead)

## 2023-10-02 ENCOUNTER — OFFICE VISIT (OUTPATIENT)
Dept: NEPHROLOGY | Facility: CLINIC | Age: 83
End: 2023-10-02
Payer: MEDICARE

## 2023-10-02 VITALS
BODY MASS INDEX: 31.58 KG/M2 | SYSTOLIC BLOOD PRESSURE: 133 MMHG | DIASTOLIC BLOOD PRESSURE: 76 MMHG | TEMPERATURE: 97.9 F | WEIGHT: 246 LBS | HEART RATE: 69 BPM

## 2023-10-02 DIAGNOSIS — N18.32 CKD STAGE G3B/A1, GFR 30-44 AND ALBUMIN CREATININE RATIO <30 MG/G (MULTI): Primary | ICD-10-CM

## 2023-10-02 PROCEDURE — 1159F MED LIST DOCD IN RCRD: CPT | Performed by: INTERNAL MEDICINE

## 2023-10-02 PROCEDURE — 1126F AMNT PAIN NOTED NONE PRSNT: CPT | Performed by: INTERNAL MEDICINE

## 2023-10-02 PROCEDURE — 3075F SYST BP GE 130 - 139MM HG: CPT | Performed by: INTERNAL MEDICINE

## 2023-10-02 PROCEDURE — 99213 OFFICE O/P EST LOW 20 MIN: CPT | Performed by: INTERNAL MEDICINE

## 2023-10-02 PROCEDURE — 1160F RVW MEDS BY RX/DR IN RCRD: CPT | Performed by: INTERNAL MEDICINE

## 2023-10-02 PROCEDURE — 1036F TOBACCO NON-USER: CPT | Performed by: INTERNAL MEDICINE

## 2023-10-02 PROCEDURE — 3078F DIAST BP <80 MM HG: CPT | Performed by: INTERNAL MEDICINE

## 2023-10-02 NOTE — PROGRESS NOTES
"For follow up, doing well.  No complaints  No hospitalizations/illness since last visit  Home BP 130s/70s  Denies orthostatic symptoms but wife says he occasionally feels dizzy on standing    RoS negative for all other systems except as noted above.     No distress  HEENT:  moist, no pallor  No edema candace LE  Breath sounds candace equal, clear  S1 S2 regular, normal, no rub or murmur  Abdomen soft, non tender  AAO x3, non focal      83 year old  CKD  G3bA1:    1. CKD  : Due to   Labs from  9/27/23  reviewed.   Lab Results   Component Value Date    CREATININE 1.92 (H) 09/27/2023    No results found for: \"ALBUR\", \"WXE10USA\"   Lab Results   Component Value Date    GFRMALE 34 (A) 09/27/2023    GFRMALE 37 (A) 06/12/2023    GFRMALE 38 (A) 05/20/2023     eGFR  34 ml/min/1.73 m2, stable. Avoid NSAIDs and iodinated contrast.     2. Volume status:  Euvolemic    3. Anemia:   Lab Results   Component Value Date    HGB 16.1 09/27/2023   Monitor  4. MBD:   Lab Results   Component Value Date    CALCIUM 10.3 09/27/2023    PHOS 4.5 09/27/2023      Lab Results   Component Value Date    VITD25 39 09/27/2023    VITD25 36 05/24/2022    VITD25 27 (A) 06/22/2021      Lab Results   Component Value Date    PTH 73.7 09/27/2023    PTH 83.9 05/24/2022    PTH 96.4 (H) 06/22/2021       Stable, monitor    6. Hypertension: At goal, continue current meds  Low sodium diet  Monitor BP at home and call with readings    RTC:  3-4 mo   " subacute rehab at [place]

## 2023-10-02 NOTE — OP NOTE
PROCEDURE DETAILS    Preoperative Diagnosis:  Right back atypical lipomatous tumor    Postoperative Diagnosis:  Same  Surgeon: Ashwin Mead  Resident/Fellow/Other Assistant: Sanju MIRANDA (PA student)    Procedure:  1. Resection of right back lipomatous tumor     Anesthesia: Johanna Tovar  Estimated Blood Loss: 5ml  Findings: [id number] lipomatous tumor  Specimens(s) Collected: yes,  Right back ALT, short stitch superior 12:00, single long stitch superficial, double long stitch medial 9:00    Complications: NONE  Drains and/or Catheters: 15 Fr Adarsh drain  Patient Returned To/Condition: PACU        Operative Report:   Indications: Mr. Wong is an 83-year-old male presenting with a right back lipomatous tumor.  This had been present for a while and had grown slowly over years.   Imaging demonstrated an area concerning for either hemorrhage or nodularity.  This was reviewed in our multidisciplinary sarcoma tumor board.  IR biopsy was not recommended.  Recommendation was for marginal excision.  The patient understood the risks,  benefits, and alternatives including the possibility that additional procedures would be needed in the future.  Informed consent was obtained.    Description of the procedure:   The patient was brought to the operating room and placed supine on the table. Sequential compression devices were applied. General anesthesia was smoothly induced.  The patient's position was changed to prone with all pressure points padded.  The operative  sites were prepped and draped in the usual fashion. Timeout was performed and preoperative antibiotics given.    A vertical incision was made over top of the palpable mass.  Dissection carried through the subcutaneous tissues using electrocautery.  The capsule of the lipomatous tumor was dissected out for the majority of this mass except for the left lower medial  aspect of the lipomatous tumor in which the capsule was violated.  Nonetheless, the rest  remainder of the mass came out intact.  There is no violation of the underlying fascia.  The cavity was then irrigated and hemostasis was assured.  A 15 Hebrew Adarsh  drain was placed into the cavity.  The wound was then closed in layers with 3-0 Vicryl for the Calderon's layer and the deep dermal layers followed by 4 Monocryl for the skin.  Skin glue was used as a dressing.    The patient tolerated the procedure well without any apparent intraoperative complications. All sponge, needle, and instrument counts were correct.  As the attending surgeon I was scrubbed for all key portions of the procedure.  Note Recipients:   Sahra De Leon MD - [phone] [Preferred]                        Attestation:   Note Completion:  Attending Attestation I performed the procedure without a resident         Electronic Signatures:  Ashwin Mead)  (Signed 28-Jun-2023 11:05)   Authored: Post-Operative Note, Chart Review, Note Completion      Last Updated: 28-Jun-2023 11:05 by Ashwin Mead)

## 2023-10-27 ENCOUNTER — OFFICE VISIT (OUTPATIENT)
Dept: PRIMARY CARE | Facility: CLINIC | Age: 83
End: 2023-10-27
Payer: MEDICARE

## 2023-10-27 VITALS
OXYGEN SATURATION: 94 % | HEIGHT: 74 IN | DIASTOLIC BLOOD PRESSURE: 66 MMHG | BODY MASS INDEX: 31.11 KG/M2 | HEART RATE: 75 BPM | WEIGHT: 242.4 LBS | SYSTOLIC BLOOD PRESSURE: 115 MMHG

## 2023-10-27 DIAGNOSIS — I10 PRIMARY HYPERTENSION: ICD-10-CM

## 2023-10-27 DIAGNOSIS — R60.9 DEPENDENT EDEMA: Primary | ICD-10-CM

## 2023-10-27 DIAGNOSIS — R06.09 DOE (DYSPNEA ON EXERTION): ICD-10-CM

## 2023-10-27 PROCEDURE — 3078F DIAST BP <80 MM HG: CPT | Performed by: STUDENT IN AN ORGANIZED HEALTH CARE EDUCATION/TRAINING PROGRAM

## 2023-10-27 PROCEDURE — 1126F AMNT PAIN NOTED NONE PRSNT: CPT | Performed by: STUDENT IN AN ORGANIZED HEALTH CARE EDUCATION/TRAINING PROGRAM

## 2023-10-27 PROCEDURE — 1160F RVW MEDS BY RX/DR IN RCRD: CPT | Performed by: STUDENT IN AN ORGANIZED HEALTH CARE EDUCATION/TRAINING PROGRAM

## 2023-10-27 PROCEDURE — 99214 OFFICE O/P EST MOD 30 MIN: CPT | Performed by: STUDENT IN AN ORGANIZED HEALTH CARE EDUCATION/TRAINING PROGRAM

## 2023-10-27 PROCEDURE — 1159F MED LIST DOCD IN RCRD: CPT | Performed by: STUDENT IN AN ORGANIZED HEALTH CARE EDUCATION/TRAINING PROGRAM

## 2023-10-27 PROCEDURE — 3074F SYST BP LT 130 MM HG: CPT | Performed by: STUDENT IN AN ORGANIZED HEALTH CARE EDUCATION/TRAINING PROGRAM

## 2023-10-27 PROCEDURE — 1036F TOBACCO NON-USER: CPT | Performed by: STUDENT IN AN ORGANIZED HEALTH CARE EDUCATION/TRAINING PROGRAM

## 2023-10-27 NOTE — PROGRESS NOTES
"Subjective   Patient ID: Rambo Wong is a 83 y.o. male who presents for Follow-up (Swelling of feet. ).        HPI  84 y/o male with h/o alcohol abuse (sober since the last 3 years as of june 2020 ) , HTN, HPL, alcohol induced dementia , former smoker with 40 pk year history     LE edema , worse at the end of the day   Does not elevate his feet   Visit Vitals  /66   Pulse 75   Ht 1.88 m (6' 2\")   Wt 110 kg (242 lb 6.4 oz)   SpO2 94%   BMI 31.12 kg/m²   Smoking Status Former   BSA 2.4 m²      No LMP for male patient.     Review of Systems    Constitutional : No feeling poorly / fevers/ chills / night sweats/ fatigue   Cardiovascular : No CP /Palpitations/ lower extremity edema / syncope   Respiratory : No Cough /BEAULIEU/Dyspnea at rest   Gastrointestinal : No abd pain / N/V  No bloody stools/ melena / constipation  Endo : No polyuria/polydipsia/ muscle weakness / sluggishness   CNS: No confusion / HA/ tingling/ numbness/ weakness of extremities  Psychiatric: No anxiety/ depression/ SI/HI    All other systems have been reviewed and are negative for complaint       Physical Exam    Constitutional : Vitals reviewed. Alert and in no distress  Cardiovascular : RRR, Normal S1, S2, No pericardial rub/ gallop, no peripheral edema   Pulmonary: No respiratory distress, CTAB   MSK : Normal gait and station , strength and tone   Neurologic : CNs 2-12 grossly intact , no obvious FNDs  Psych : A,Ox3, normal mood and affect      Assessment/Plan   Diagnoses and all orders for this visit:  Dependent edema  Primary hypertension  BEAULIEU (dyspnea on exertion)  -     Transthoracic Echo (TTE) Complete; Future      84 y/o male with h/o alcohol abuse (sober since the last 3 years as of june 2020 ) , HTN, HPL, alcohol induced dementia , former smoker with 40 pk year history     LE edema is likely dependent as well as chronic venous insuff  Compression stockings and foot end elevation advised   Amlodipine could also be contributory  no " changes to med regimen today   Echo ordered given his chronic HTN and h/o alcohol abuse in the past and BEAULIEU     Conditions addressed and mgmt as noted above.  Pertinent labs, images/ imaging reports , chart review was done .   Age appropriate labs / labs for mgmt of chronic medical conditions ordered, further mgmt pending the results.

## 2023-11-29 ENCOUNTER — HOSPITAL ENCOUNTER (OUTPATIENT)
Dept: CARDIOLOGY | Facility: CLINIC | Age: 83
Discharge: HOME | End: 2023-11-29
Payer: MEDICARE

## 2023-11-29 DIAGNOSIS — R06.00 DYSPNEA, UNSPECIFIED: ICD-10-CM

## 2023-11-29 DIAGNOSIS — R06.09 DOE (DYSPNEA ON EXERTION): ICD-10-CM

## 2023-11-29 LAB
AORTIC VALVE MEAN GRADIENT: 7.6
AORTIC VALVE PEAK VELOCITY: 1.8
AV PEAK GRADIENT: 12.9
AVA (PEAK VEL): 1.82
AVA (VTI): 1.86
EJECTION FRACTION APICAL 4 CHAMBER: 60.2
EJECTION FRACTION: 59
LEFT ATRIUM VOLUME AREA LENGTH INDEX BSA: 25.8
LEFT VENTRICLE INTERNAL DIMENSION DIASTOLE: 5.03 (ref 3.5–6)
LEFT VENTRICULAR OUTFLOW TRACT DIAMETER: 2.41
MITRAL VALVE E/A RATIO: 0.47
RIGHT VENTRICLE FREE WALL PEAK S': 15
TRICUSPID ANNULAR PLANE SYSTOLIC EXCURSION: 2.1

## 2023-11-29 PROCEDURE — 93306 TTE W/DOPPLER COMPLETE: CPT

## 2023-11-29 PROCEDURE — 93306 TTE W/DOPPLER COMPLETE: CPT | Performed by: INTERNAL MEDICINE

## 2024-01-10 DIAGNOSIS — I10 BENIGN ESSENTIAL HYPERTENSION: ICD-10-CM

## 2024-01-12 RX ORDER — LABETALOL 100 MG/1
TABLET, FILM COATED ORAL
Qty: 270 TABLET | Refills: 3 | Status: SHIPPED | OUTPATIENT
Start: 2024-01-12

## 2024-01-30 ENCOUNTER — LAB (OUTPATIENT)
Dept: LAB | Facility: LAB | Age: 84
End: 2024-01-30
Payer: MEDICARE

## 2024-01-30 DIAGNOSIS — N18.32 CKD STAGE G3B/A1, GFR 30-44 AND ALBUMIN CREATININE RATIO <30 MG/G (MULTI): ICD-10-CM

## 2024-01-30 LAB
25(OH)D3 SERPL-MCNC: 59 NG/ML (ref 30–100)
ALBUMIN SERPL BCP-MCNC: 4 G/DL (ref 3.4–5)
ANION GAP SERPL CALC-SCNC: 11 MMOL/L (ref 10–20)
BUN SERPL-MCNC: 8 MG/DL (ref 6–23)
CALCIUM SERPL-MCNC: 10.1 MG/DL (ref 8.6–10.6)
CHLORIDE SERPL-SCNC: 101 MMOL/L (ref 98–107)
CO2 SERPL-SCNC: 32 MMOL/L (ref 21–32)
CREAT SERPL-MCNC: 1.88 MG/DL (ref 0.5–1.3)
EGFRCR SERPLBLD CKD-EPI 2021: 35 ML/MIN/1.73M*2
GLUCOSE SERPL-MCNC: 110 MG/DL (ref 74–99)
PHOSPHATE SERPL-MCNC: 4.5 MG/DL (ref 2.5–4.9)
POTASSIUM SERPL-SCNC: 4.6 MMOL/L (ref 3.5–5.3)
PTH-INTACT SERPL-MCNC: 87.8 PG/ML (ref 18.5–88)
SODIUM SERPL-SCNC: 139 MMOL/L (ref 136–145)

## 2024-01-30 PROCEDURE — 83970 ASSAY OF PARATHORMONE: CPT

## 2024-01-30 PROCEDURE — 36415 COLL VENOUS BLD VENIPUNCTURE: CPT

## 2024-01-30 PROCEDURE — 80069 RENAL FUNCTION PANEL: CPT

## 2024-01-30 PROCEDURE — 82306 VITAMIN D 25 HYDROXY: CPT

## 2024-02-05 ENCOUNTER — OFFICE VISIT (OUTPATIENT)
Dept: NEPHROLOGY | Facility: CLINIC | Age: 84
End: 2024-02-05
Payer: MEDICARE

## 2024-02-05 VITALS
DIASTOLIC BLOOD PRESSURE: 64 MMHG | HEART RATE: 71 BPM | BODY MASS INDEX: 29.9 KG/M2 | HEIGHT: 74 IN | WEIGHT: 233 LBS | SYSTOLIC BLOOD PRESSURE: 109 MMHG

## 2024-02-05 DIAGNOSIS — N18.32 CKD STAGE G3B/A1, GFR 30-44 AND ALBUMIN CREATININE RATIO <30 MG/G (MULTI): Primary | ICD-10-CM

## 2024-02-05 PROCEDURE — 3074F SYST BP LT 130 MM HG: CPT | Performed by: INTERNAL MEDICINE

## 2024-02-05 PROCEDURE — 1126F AMNT PAIN NOTED NONE PRSNT: CPT | Performed by: INTERNAL MEDICINE

## 2024-02-05 PROCEDURE — 99214 OFFICE O/P EST MOD 30 MIN: CPT | Performed by: INTERNAL MEDICINE

## 2024-02-05 PROCEDURE — 3078F DIAST BP <80 MM HG: CPT | Performed by: INTERNAL MEDICINE

## 2024-02-05 PROCEDURE — 1036F TOBACCO NON-USER: CPT | Performed by: INTERNAL MEDICINE

## 2024-02-05 NOTE — PROGRESS NOTES
For follow up, here with wife, doing well.  No complaints  No hospitalizations/illness since last visit  Not checking BP at home  Continues to have dizziness on standing few times a week    RoS negative for all other systems except as noted above.    Vitals:    02/05/24 1548   BP: 109/64   Pulse: 71     No distress  HEENT:  moist, no pallor  No edema candace LE  Breath sounds candace equal, clear  S1 S2 regular, normal, no rub or murmur  Abdomen soft  AAO x3, non focal    Current Outpatient Medications   Medication Instructions    albuterol (ProAir HFA) 90 mcg/actuation inhaler 2 puffs, inhalation, Every 4 hours PRN    amLODIPine (NORVASC) 10 mg, oral, Daily    ascorbic acid (Vitamin C) 250 mg tablet Vitamin C TABS   Refills: 0       Active    atorvastatin (LIPITOR) 10 mg, oral, Nightly    azelastine (Astelin) 137 mcg (0.1 %) nasal spray 1 spray, Each Nostril, 2 times daily, Use in each nostril as directed    azelastine (Optivar) 0.05 % ophthalmic solution Azelastine HCl - 0.05 % Ophthalmic Solution   Quantity: 6  Refills: 0        Start : 8-Jun-2017   Active    cetirizine (ZyrTEC) 10 mg tablet Take 1 tablet (10 mg) by mouth 2 times a day.    cholecalciferol (Vitamin D-3) 5,000 Units tablet 1 tablet, oral, Daily    cyanocobalamin, vitamin B-12, (VITAMIN B-12 ORAL) Take 1,000 mg by mouth once daily.    fluticasone (Flonase) 50 mcg/actuation nasal spray 2 sprays, Each Nostril, Daily    folic acid (Folvite) 800 mcg tablet 1 tablet (800 mcg) once daily.    ipratropium (Atrovent) 21 mcg (0.03 %) nasal spray Ipratropium Bromide 0.03 % Nasal Solution   Quantity: 60  Refills: 0        Start : 15-Jose-2022   Active    labetalol (Normodyne) 100 mg tablet TAKE 2 TABLETS EVERY MORNING .    montelukast (Singulair) 10 mg tablet Take 1 tablet (10 mg) by mouth once daily at bedtime.    Mucus Relief  mg, oral, 2 times daily, As needed    tamsulosin (Flomax) 0.4 mg 24 hr capsule 1 capsule, oral, Nightly      84 year old with and CKD   "    1. CKD G 3b A1 : Likely due to long standing htn  Labs from EMR reviewed.   Lab Results   Component Value Date    CREATININE 1.88 (H) 01/30/2024    No components found for: \"MICROALBUCREA\", \"3\"   Lab Results   Component Value Date    GFRMALE 34 (A) 09/27/2023    GFRMALE 37 (A) 06/12/2023    GFRMALE 38 (A) 05/20/2023     Renal function stable. Avoid NSAIDs     2. Volume status:  Euvolemic    3. Anemia:   Lab Results   Component Value Date    HGB 16.1 09/27/2023    Monitor    4. MBD:   Lab Results   Component Value Date    CALCIUM 10.1 01/30/2024    PHOS 4.5 01/30/2024      Lab Results   Component Value Date    VITD25 59 01/30/2024    VITD25 39 09/27/2023    VITD25 36 05/24/2022      Lab Results   Component Value Date    PTH 87.8 01/30/2024    PTH 73.7 09/27/2023    PTH 83.9 05/24/2022    Ct vit D 5000 units PO daily  Check Ca, PO4, 25 OH vit D and PTH before next visit    5.Acid base  Lab Results   Component Value Date    CO2 32 01/30/2024   Monitor    6. Hypertension: Has frequent episodes of symptomatic orthostasis. Recommend decrease Labetalol to 100 mg PO daily, ct amlodipine 10 mg for now, if symptoms persist call me. Advised to stand up slowly with support to reduce risk of falls.    RTC:  6 mo, sooner if needed      "

## 2024-04-24 ENCOUNTER — APPOINTMENT (OUTPATIENT)
Dept: UROLOGY | Facility: HOSPITAL | Age: 84
End: 2024-04-24
Payer: MEDICARE

## 2024-04-24 NOTE — PROGRESS NOTES
[facility]  Outpatient Clinic Note    Subjective   Rambo Wong is a 84 y.o. male    History of Present Illness   Patient with history of BPH presents for annual FUV. Previously followed Zainab Demarco CNP.   Taking Tamsulosin 0.4 mg daily. Not bothered with DTF, NTF 1 to 2x.   Denies gross hematuria, dysuria, frequency, urgency, leaking, urinary tract infections, fevers, or chills. No ED concerns  Hx of a left hydrocele for which he had seen Dr. Cooney.   Scrotal ultrasound had confirmed the hydrocele. Since 2015 it has not bothered him or gotten any bigger.      Urinalysis today   PVR     Lab Results   Component Value Date    PSA 3.56 09/27/2023     Past Medical History and Surgical History   Past Medical History:   Diagnosis Date    Allergy status to unspecified drugs, medicaments and biological substances     History of allergy    Essential (primary) hypertension 06/22/2020    Benign essential hypertension    Left lower quadrant pain     Abdominal pain, LLQ (left lower quadrant)    Other amnesia     Memory loss    Other conditions influencing health status 08/20/2013    Alzheimer Disease Early Onset Uncomplicated    Other conditions influencing health status     Headache In The Forehead (Frontal)    Personal history of other diseases of the circulatory system     History of hypertension    Personal history of other endocrine, nutritional and metabolic disease     History of hypercholesterolemia    Personal history of other mental and behavioral disorders 12/01/2017    History of depression    Personal history of other specified conditions 05/15/2014    History of dizziness    Personal history of other specified conditions 12/01/2017    History of chest pain    Personal history of other specified conditions     History of headache     Past Surgical History:   Procedure Laterality Date    ANTERIOR CRUCIATE LIGAMENT REPAIR  04/18/2014    Primary Repair Of Knee Ligament Cruciate Anterior    CT ANGIO NECK   3/4/2023    CT NECK ANGIO W AND WO IV CONTRAST [facility]    CT HEAD ANGIO W AND WO IV CONTRAST  3/4/2023    CT HEAD ANGIO W AND WO IV CONTRAST [facility]    HERNIA REPAIR  04/18/2014    Inguinal Hernia Repair    OTHER SURGICAL HISTORY  01/04/2019    Cataract surgery    OTHER SURGICAL HISTORY  04/18/2014    Closed Treatment Of Bimalleolar Ankle Fracture       Medications  Current Outpatient Medications on File Prior to Visit   Medication Sig Dispense Refill    albuterol (ProAir HFA) 90 mcg/actuation inhaler Inhale 2 puffs every 4 hours if needed for wheezing or shortness of breath. 8.5 g 3    amLODIPine (Norvasc) 10 mg tablet Take 1 tablet (10 mg) by mouth once daily. 90 tablet 3    ascorbic acid (Vitamin C) 250 mg tablet Vitamin C TABS   Refills: 0       Active      atorvastatin (Lipitor) 10 mg tablet Take 1 tablet (10 mg) by mouth once daily at bedtime. 90 tablet 3    azelastine (Astelin) 137 mcg (0.1 %) nasal spray Administer 1 spray into each nostril 2 times a day. Use in each nostril as directed      azelastine (Optivar) 0.05 % ophthalmic solution Azelastine HCl - 0.05 % Ophthalmic Solution   Quantity: 6  Refills: 0        Start : 8-Jun-2017   Active      cetirizine (ZyrTEC) 10 mg tablet Take 1 tablet (10 mg) by mouth 2 times a day.      cholecalciferol (Vitamin D-3) 5,000 Units tablet Take 1 tablet (5,000 Units) by mouth once daily.      cyanocobalamin, vitamin B-12, (VITAMIN B-12 ORAL) Take 1,000 mg by mouth once daily.      fluticasone (Flonase) 50 mcg/actuation nasal spray Administer 2 sprays into each nostril once daily.      folic acid (Folvite) 800 mcg tablet 1 tablet (800 mcg) once daily.      ipratropium (Atrovent) 21 mcg (0.03 %) nasal spray Ipratropium Bromide 0.03 % Nasal Solution   Quantity: 60  Refills: 0        Start : 15-Jose-2022   Active      labetalol (Normodyne) 100 mg tablet TAKE 2 TABLETS EVERY MORNING . (Patient taking differently: Take 1 tablet (100 mg) by mouth 2 times a day. TAKE 2 TABLETS  EVERY MORNING .) 270 tablet 3    montelukast (Singulair) 10 mg tablet Take 1 tablet (10 mg) by mouth once daily at bedtime.      Mucus Relief  mg 12 hr tablet Take 1 tablet (600 mg) by mouth 2 times a day. As needed      tamsulosin (Flomax) 0.4 mg 24 hr capsule Take 1 capsule (0.4 mg) by mouth once daily at bedtime.       No current facility-administered medications on file prior to visit.       Objective   Physicial Exam  General: Well developed, well nourished, alert and cooperative, appears in no acute distress  Eyes: Non-injected conjunctiva, sclera clear, no proptosis  Cardiac: Extremities are warm and well perfused. No edema, cyanosis or pallor.   Lungs: Breathing is easy, non-labored. Speaking in clear and complete sentences. Normal diaphragmatic movement.  MSK: Ambulatory with steady gait, unassisted  Neuro: alert and oriented to person, place and time  Psych: Demonstrates good judgement and reason, without hallucinations, abnormal affect or abnormal behaviors.  Skin: no obvious lesions, no rashes.    Review of Systems  All other systems have been reviewed and are negative for complaint.      Assessment and Plan   -BPH   Continue Tamsulosin 0.4 mg daily at bedtime. Refilled today [facility]      RTC annually with PSA prior, sooner if needed     All questions and concerns were addressed. Patient verbalizes understanding and has no other questions at this time.   You are able to have email access to your chart. You can sign into [employer] or add the [facility] on your smart phone to review today's visit, laboratory work and imaging.   If you have any questions about your care, do not hesitate to call and leave a message, we return calls in a timely manner.    Renita Luna-- POLO KELLEY  Office Phone:  [phone]

## 2024-05-08 DIAGNOSIS — I10 PRIMARY HYPERTENSION: ICD-10-CM

## 2024-05-09 RX ORDER — AMLODIPINE BESYLATE 10 MG/1
10 TABLET ORAL DAILY
Qty: 90 TABLET | Refills: 0 | Status: SHIPPED | OUTPATIENT
Start: 2024-05-09

## 2024-05-23 ENCOUNTER — HOSPITAL ENCOUNTER (EMERGENCY)
Facility: HOSPITAL | Age: 84
Discharge: HOME | End: 2024-05-23
Attending: EMERGENCY MEDICINE
Payer: MEDICARE

## 2024-05-23 ENCOUNTER — APPOINTMENT (OUTPATIENT)
Dept: RADIOLOGY | Facility: HOSPITAL | Age: 84
End: 2024-05-23
Payer: MEDICARE

## 2024-05-23 VITALS
OXYGEN SATURATION: 94 % | TEMPERATURE: 97.7 F | HEART RATE: 60 BPM | WEIGHT: 230 LBS | RESPIRATION RATE: 20 BRPM | HEIGHT: 74 IN | DIASTOLIC BLOOD PRESSURE: 82 MMHG | SYSTOLIC BLOOD PRESSURE: 121 MMHG | BODY MASS INDEX: 29.52 KG/M2

## 2024-05-23 DIAGNOSIS — J40 BRONCHITIS: Primary | ICD-10-CM

## 2024-05-23 DIAGNOSIS — R55 NEAR SYNCOPE: ICD-10-CM

## 2024-05-23 LAB
ANION GAP BLDV CALCULATED.4IONS-SCNC: 10 MMOL/L (ref 10–25)
ANION GAP SERPL CALC-SCNC: 12 MMOL/L (ref 10–20)
BASE EXCESS BLDV CALC-SCNC: 1.4 MMOL/L (ref -2–3)
BASOPHILS # BLD AUTO: 0.07 X10*3/UL (ref 0–0.1)
BASOPHILS NFR BLD AUTO: 1.2 %
BNP SERPL-MCNC: 10 PG/ML (ref 0–99)
BODY TEMPERATURE: 37 DEGREES CELSIUS
BUN SERPL-MCNC: 16 MG/DL (ref 6–23)
CA-I BLDV-SCNC: 1.22 MMOL/L (ref 1.1–1.33)
CALCIUM SERPL-MCNC: 9 MG/DL (ref 8.6–10.3)
CARDIAC TROPONIN I PNL SERPL HS: 5 NG/L (ref 0–20)
CARDIAC TROPONIN I PNL SERPL HS: 6 NG/L (ref 0–20)
CARDIAC TROPONIN I PNL SERPL HS: 6 NG/L (ref 0–20)
CHLORIDE BLDV-SCNC: 102 MMOL/L (ref 98–107)
CHLORIDE SERPL-SCNC: 104 MMOL/L (ref 98–107)
CO2 SERPL-SCNC: 26 MMOL/L (ref 21–32)
CREAT SERPL-MCNC: 1.91 MG/DL (ref 0.5–1.3)
EGFRCR SERPLBLD CKD-EPI 2021: 34 ML/MIN/1.73M*2
EOSINOPHIL # BLD AUTO: 0.54 X10*3/UL (ref 0–0.4)
EOSINOPHIL NFR BLD AUTO: 9.2 %
ERYTHROCYTE [DISTWIDTH] IN BLOOD BY AUTOMATED COUNT: 14.7 % (ref 11.5–14.5)
GLUCOSE BLDV-MCNC: 109 MG/DL (ref 74–99)
GLUCOSE SERPL-MCNC: 102 MG/DL (ref 74–99)
HCO3 BLDV-SCNC: 26.6 MMOL/L (ref 22–26)
HCT VFR BLD AUTO: 45.7 % (ref 41–52)
HCT VFR BLD EST: 48 % (ref 41–52)
HGB BLD-MCNC: 15.4 G/DL (ref 13.5–17.5)
HGB BLDV-MCNC: 16.1 G/DL (ref 13.5–17.5)
IMM GRANULOCYTES # BLD AUTO: 0.01 X10*3/UL (ref 0–0.5)
IMM GRANULOCYTES NFR BLD AUTO: 0.2 % (ref 0–0.9)
INHALED O2 CONCENTRATION: 21 %
LACTATE BLDV-SCNC: 1.1 MMOL/L (ref 0.4–2)
LYMPHOCYTES # BLD AUTO: 2.14 X10*3/UL (ref 0.8–3)
LYMPHOCYTES NFR BLD AUTO: 36.4 %
MCH RBC QN AUTO: 28.7 PG (ref 26–34)
MCHC RBC AUTO-ENTMCNC: 33.7 G/DL (ref 32–36)
MCV RBC AUTO: 85 FL (ref 80–100)
MONOCYTES # BLD AUTO: 0.76 X10*3/UL (ref 0.05–0.8)
MONOCYTES NFR BLD AUTO: 12.9 %
NEUTROPHILS # BLD AUTO: 2.36 X10*3/UL (ref 1.6–5.5)
NEUTROPHILS NFR BLD AUTO: 40.1 %
NRBC BLD-RTO: 0 /100 WBCS (ref 0–0)
OXYHGB MFR BLDV: 91.1 % (ref 45–75)
PCO2 BLDV: 43 MM HG (ref 41–51)
PH BLDV: 7.4 PH (ref 7.33–7.43)
PLATELET # BLD AUTO: 236 X10*3/UL (ref 150–450)
PO2 BLDV: 67 MM HG (ref 35–45)
POTASSIUM BLDV-SCNC: 4.1 MMOL/L (ref 3.5–5.3)
POTASSIUM SERPL-SCNC: 3.9 MMOL/L (ref 3.5–5.3)
RBC # BLD AUTO: 5.36 X10*6/UL (ref 4.5–5.9)
RSV RNA RESP QL NAA+PROBE: NOT DETECTED
SAO2 % BLDV: 94 % (ref 45–75)
SARS-COV-2 RNA RESP QL NAA+PROBE: NOT DETECTED
SODIUM BLDV-SCNC: 134 MMOL/L (ref 136–145)
SODIUM SERPL-SCNC: 138 MMOL/L (ref 136–145)
WBC # BLD AUTO: 5.9 X10*3/UL (ref 4.4–11.3)

## 2024-05-23 PROCEDURE — 83880 ASSAY OF NATRIURETIC PEPTIDE: CPT | Performed by: PHYSICIAN ASSISTANT

## 2024-05-23 PROCEDURE — 84484 ASSAY OF TROPONIN QUANT: CPT | Mod: 91 | Performed by: EMERGENCY MEDICINE

## 2024-05-23 PROCEDURE — 85025 COMPLETE CBC W/AUTO DIFF WBC: CPT | Performed by: EMERGENCY MEDICINE

## 2024-05-23 PROCEDURE — 84132 ASSAY OF SERUM POTASSIUM: CPT | Mod: 91 | Performed by: EMERGENCY MEDICINE

## 2024-05-23 PROCEDURE — 71045 X-RAY EXAM CHEST 1 VIEW: CPT

## 2024-05-23 PROCEDURE — 96361 HYDRATE IV INFUSION ADD-ON: CPT

## 2024-05-23 PROCEDURE — 84132 ASSAY OF SERUM POTASSIUM: CPT | Performed by: EMERGENCY MEDICINE

## 2024-05-23 PROCEDURE — 99283 EMERGENCY DEPT VISIT LOW MDM: CPT | Mod: 25

## 2024-05-23 PROCEDURE — 96360 HYDRATION IV INFUSION INIT: CPT

## 2024-05-23 PROCEDURE — 2500000004 HC RX 250 GENERAL PHARMACY W/ HCPCS (ALT 636 FOR OP/ED): Performed by: EMERGENCY MEDICINE

## 2024-05-23 PROCEDURE — 87634 RSV DNA/RNA AMP PROBE: CPT | Performed by: PHYSICIAN ASSISTANT

## 2024-05-23 PROCEDURE — 84484 ASSAY OF TROPONIN QUANT: CPT | Performed by: PHYSICIAN ASSISTANT

## 2024-05-23 PROCEDURE — 94640 AIRWAY INHALATION TREATMENT: CPT

## 2024-05-23 PROCEDURE — 71045 X-RAY EXAM CHEST 1 VIEW: CPT | Mod: FOREIGN READ | Performed by: RADIOLOGY

## 2024-05-23 PROCEDURE — 36415 COLL VENOUS BLD VENIPUNCTURE: CPT | Performed by: EMERGENCY MEDICINE

## 2024-05-23 PROCEDURE — 2500000002 HC RX 250 W HCPCS SELF ADMINISTERED DRUGS (ALT 637 FOR MEDICARE OP, ALT 636 FOR OP/ED): Performed by: EMERGENCY MEDICINE

## 2024-05-23 PROCEDURE — 87635 SARS-COV-2 COVID-19 AMP PRB: CPT | Performed by: PHYSICIAN ASSISTANT

## 2024-05-23 RX ORDER — IPRATROPIUM BROMIDE AND ALBUTEROL SULFATE 2.5; .5 MG/3ML; MG/3ML
3 SOLUTION RESPIRATORY (INHALATION) ONCE
Status: COMPLETED | OUTPATIENT
Start: 2024-05-23 | End: 2024-05-23

## 2024-05-23 RX ORDER — ALBUTEROL SULFATE 90 UG/1
2 AEROSOL, METERED RESPIRATORY (INHALATION) EVERY 4 HOURS PRN
Qty: 25.5 G | Refills: 0 | Status: SHIPPED | OUTPATIENT
Start: 2024-05-23 | End: 2024-06-22

## 2024-05-23 RX ORDER — PREDNISONE 20 MG/1
40 TABLET ORAL DAILY
Qty: 10 TABLET | Refills: 0 | Status: SHIPPED | OUTPATIENT
Start: 2024-05-23 | End: 2024-05-28

## 2024-05-23 RX ORDER — AZITHROMYCIN 250 MG/1
250 TABLET, FILM COATED ORAL DAILY
Qty: 6 TABLET | Refills: 0 | Status: SHIPPED | OUTPATIENT
Start: 2024-05-23 | End: 2024-05-28

## 2024-05-23 RX ORDER — PREDNISONE 20 MG/1
40 TABLET ORAL DAILY
Qty: 10 TABLET | Refills: 0 | Status: SHIPPED | OUTPATIENT
Start: 2024-05-23 | End: 2024-05-23

## 2024-05-23 RX ORDER — ALBUTEROL SULFATE 90 UG/1
2 AEROSOL, METERED RESPIRATORY (INHALATION) EVERY 4 HOURS PRN
Qty: 25.5 UNSPECIFIED | Refills: 0 | Status: SHIPPED | OUTPATIENT
Start: 2024-05-23 | End: 2024-05-23

## 2024-05-23 RX ORDER — AZITHROMYCIN 250 MG/1
250 TABLET, FILM COATED ORAL DAILY
Qty: 6 TABLET | Refills: 0 | Status: SHIPPED | OUTPATIENT
Start: 2024-05-23 | End: 2024-05-23

## 2024-05-23 RX ADMIN — IPRATROPIUM BROMIDE AND ALBUTEROL SULFATE 3 ML: 2.5; .5 SOLUTION RESPIRATORY (INHALATION) at 01:13

## 2024-05-23 RX ADMIN — SODIUM CHLORIDE 1000 ML: 9 INJECTION, SOLUTION INTRAVENOUS at 01:13

## 2024-05-23 ASSESSMENT — PAIN SCALES - GENERAL: PAINLEVEL_OUTOF10: 0 - NO PAIN

## 2024-05-23 NOTE — ED TRIAGE NOTES
Pt arrived to the ED via [facility] coming from home with a chief complaint of sob. Pt reports using his personal inhaler 2x with no relief. Pt has also had seasonal allergies the last two weeks that have caused progressive sob. Pt was initially 92% on ra, received 1 nebulizer tx of duo neb by ems with improvement. Denies pain. Abcs intact. Pt arrived on 4 liters via nc at 98%, o2 removed and saturating at 95%. No audible wheezing or tachypnea. Vitals wnl.

## 2024-05-23 NOTE — ED PROVIDER NOTES
HPI   Chief Complaint   Patient presents with    Shortness of Breath       HPI  Patient presents with near syncope and worsening nasal congestion.  He states that he has been cough feeling.  Any fever.  Denies any vomiting diarrhea, constipation, dysuria or other symptoms.  Denies any pain issues including chest pain or headache.  He states that he has never passed out or felt this lightheaded in the past.  He states he currently has no symptoms besides his nasal congestion.  He states he is taking Flonase.  Denies a history of smoking.  Denies a history of COPD.                  Kelly Coma Scale Score: 14                     Patient History   Past Medical History:   Diagnosis Date    Allergy status to unspecified drugs, medicaments and biological substances     History of allergy    Essential (primary) hypertension 06/22/2020    Benign essential hypertension    Left lower quadrant pain     Abdominal pain, LLQ (left lower quadrant)    Other amnesia     Memory loss    Other conditions influencing health status 08/20/2013    Alzheimer Disease Early Onset Uncomplicated    Other conditions influencing health status     Headache In The Forehead (Frontal)    Personal history of other diseases of the circulatory system     History of hypertension    Personal history of other endocrine, nutritional and metabolic disease     History of hypercholesterolemia    Personal history of other mental and behavioral disorders 12/01/2017    History of depression    Personal history of other specified conditions 05/15/2014    History of dizziness    Personal history of other specified conditions 12/01/2017    History of chest pain    Personal history of other specified conditions     History of headache     Past Surgical History:   Procedure Laterality Date    ANTERIOR CRUCIATE LIGAMENT REPAIR  04/18/2014    Primary Repair Of Knee Ligament Cruciate Anterior    CT ANGIO NECK  3/4/2023    CT NECK ANGIO W AND WO IV CONTRAST [facility] CT    CT  HEAD ANGIO W AND WO IV CONTRAST  3/4/2023    CT HEAD ANGIO W AND WO IV CONTRAST [facility]    HERNIA REPAIR  2014    Inguinal Hernia Repair    OTHER SURGICAL HISTORY  2019    Cataract surgery    OTHER SURGICAL HISTORY  2014    Closed Treatment Of Bimalleolar Ankle Fracture     Family History   Problem Relation Name Age of Onset    Alcohol abuse Mother      Dementia Mother      Other (unknown cause of morbidity or mortality) Father      Breast cancer Sister      Hypertension Other      Breast cancer Other      Cancer Other       Social History     Tobacco Use    Smoking status: Former     Current packs/day: 0.00     Types: Cigarettes     Quit date:      Years since quittin.4    Smokeless tobacco: Never   Substance Use Topics    Alcohol use: Not Currently    Drug use: Never       Physical Exam   ED Triage Vitals [24 0035]   Temperature Heart Rate Respirations BP   36.5 °C (97.7 °F) 70 20 153/87      Pulse Ox Temp Source Heart Rate Source Patient Position   98 % Oral Monitor Sitting      BP Location FiO2 (%)     Right arm --       Physical Exam  Vitals and nursing note reviewed.   Constitutional:       General: He is not in acute distress.     Appearance: He is well-developed.   HENT:      Head: Normocephalic and atraumatic.   Eyes:      Conjunctiva/sclera: Conjunctivae normal.   Cardiovascular:      Rate and Rhythm: Normal rate and regular rhythm.      Heart sounds: No murmur heard.  Pulmonary:      Effort: Pulmonary effort is normal. No respiratory distress.      Breath sounds: Normal breath sounds.   Abdominal:      Palpations: Abdomen is soft.      Tenderness: There is no abdominal tenderness.   Musculoskeletal:         General: No swelling.      Cervical back: Neck supple.   Skin:     General: Skin is warm and dry.      Capillary Refill: Capillary refill takes less than 2 seconds.   Neurological:      Mental Status: He is alert.   Psychiatric:         Mood and Affect: Mood normal.          ED Course & MDM   Diagnoses as of 05/24/24 1606   Bronchitis   Near syncope       Medical Decision Making  The patient has a new wheeze on my exam.  Will obtain chest x-ray.  Patient has no stated history of COPD in his chart.  He does not smoke.  Consider dehydration versus cardiac cause of fluids.  The patient is able to verify prior symptoms or hypoxia.  Discharged home.  Treat as a COPD exacerbation/bronchitis    Procedure  Procedures     Jaden Mendez MD  05/24/24 1600

## 2024-05-23 NOTE — DISCHARGE INSTRUCTIONS
You have a wheezing illness.  In the next month you may need to talk with your primary doctor or pulmonology to rule out any COPD or lung diseases.

## 2024-06-03 NOTE — PROGRESS NOTES
Subjective   Patient ID: Rambo Wong is a 83 y.o. male who presents for Follow-up (Emergency room follow up for breathing difficulties).        HPI  82 y/o male with h/o alcohol abuse (sober since the last 3 years as of june 2020 ) , HTN, HPL, alcohol induced dementia , former smoker with 40 pk year history   Is here with his wife     ER visit on 6/12  Er course, labs and imaging reports reviewed .   No fluid overload  , ACS was r/o   It was attributed to his seasonal allergies   He is already on Rx for seasonal allergies   Wife would like an inhaler      Visit Vitals  /67   Pulse 72   Wt 106 kg (234 lb 9.6 oz)   SpO2 97%   BMI 30.12 kg/m²   Smoking Status Former   BSA 2.35 m²      No LMP for male patient.     Review of Systems    Constitutional : No feeling poorly / fevers/ chills / night sweats/ fatigue   Cardiovascular : No CP /Palpitations/ lower extremity edema / syncope   Respiratory : No Cough /BEAULIEU/Dyspnea at rest     CNS: No confusion / HA/ tingling/ numbness/ weakness of extremities  Psychiatric: No anxiety/ depression/ SI/HI    All other systems have been reviewed and are negative for complaint       Physical Exam    Constitutional : Vitals reviewed. Alert and in no distress  Cardiovascular : RRR, Normal S1, S2, No pericardial rub/ gallop, no peripheral edema   Pulmonary: No respiratory distress, CTAB   MSK : Normal gait and station , strength and tone     Neurologic : CNs 2-12 grossly intact , no obvious FNDs  Psych : A,Ox3, normal mood and affect      Assessment/Plan   Diagnoses and all orders for this visit:  Bronchospasm  -     albuterol (ProAir HFA) 90 mcg/actuation inhaler; Inhale 2 puffs every 4 hours if needed for wheezing or shortness of breath.  Dementia associated with alcoholism without behavioral disturbance (CMS/[facility])  Stage 3b chronic kidney disease  -     Lipid Panel; Future  Benign essential hypertension  Prediabetes  -     Hemoglobin A1C; Future       Start Zyrtec 10mg every night  during allergy season   Albuterol as needed       RTO in August for CPE , labs to be done before visit           Assistance OOB with selected safe patient handling equipment/Communicate Risk of Fall with Harm to all staff/Discuss with provider need for PT consult/Monitor gait and stability/Provide patient with walking aids - walker, cane, crutches/Reinforce activity limits and safety measures with patient and family/Tailored Fall Risk Interventions/Visual Cue: Yellow wristband and red socks/Bed in lowest position, wheels locked, appropriate side rails in place/Call bell, personal items and telephone in reach/Instruct patient to call for assistance before getting out of bed or chair/Non-slip footwear when patient is out of bed/[place] to call system/Physically safe environment - no spills, clutter or unnecessary equipment/Purposeful Proactive Rounding/Room/bathroom lighting operational, light cord in reach

## 2024-06-13 ENCOUNTER — APPOINTMENT (OUTPATIENT)
Dept: PRIMARY CARE | Facility: CLINIC | Age: 84
End: 2024-06-13
Payer: MEDICARE

## 2024-06-13 VITALS
OXYGEN SATURATION: 94 % | DIASTOLIC BLOOD PRESSURE: 59 MMHG | SYSTOLIC BLOOD PRESSURE: 108 MMHG | HEART RATE: 70 BPM | WEIGHT: 246.6 LBS | HEIGHT: 74 IN | BODY MASS INDEX: 31.65 KG/M2

## 2024-06-13 DIAGNOSIS — J41.1 MUCOPURULENT CHRONIC BRONCHITIS (MULTI): Primary | ICD-10-CM

## 2024-06-13 DIAGNOSIS — J34.89 NASAL OBSTRUCTION: ICD-10-CM

## 2024-06-13 DIAGNOSIS — E78.2 MIXED HYPERLIPIDEMIA: ICD-10-CM

## 2024-06-13 DIAGNOSIS — Z00.00 MEDICARE ANNUAL WELLNESS VISIT, SUBSEQUENT: ICD-10-CM

## 2024-06-13 DIAGNOSIS — Z12.5 SCREENING FOR PROSTATE CANCER: ICD-10-CM

## 2024-06-13 DIAGNOSIS — F03.90 DEMENTIA WITHOUT BEHAVIORAL DISTURBANCE, PSYCHOTIC DISTURBANCE, MOOD DISTURBANCE, OR ANXIETY, UNSPECIFIED DEMENTIA SEVERITY, UNSPECIFIED DEMENTIA TYPE (MULTI): ICD-10-CM

## 2024-06-13 DIAGNOSIS — N18.32 STAGE 3B CHRONIC KIDNEY DISEASE (MULTI): ICD-10-CM

## 2024-06-13 DIAGNOSIS — F10.27 DEMENTIA ASSOCIATED WITH ALCOHOLISM WITHOUT BEHAVIORAL DISTURBANCE (MULTI): ICD-10-CM

## 2024-06-13 DIAGNOSIS — I10 BENIGN ESSENTIAL HYPERTENSION: ICD-10-CM

## 2024-06-13 PROCEDURE — 1160F RVW MEDS BY RX/DR IN RCRD: CPT | Performed by: STUDENT IN AN ORGANIZED HEALTH CARE EDUCATION/TRAINING PROGRAM

## 2024-06-13 PROCEDURE — 3074F SYST BP LT 130 MM HG: CPT | Performed by: STUDENT IN AN ORGANIZED HEALTH CARE EDUCATION/TRAINING PROGRAM

## 2024-06-13 PROCEDURE — 99214 OFFICE O/P EST MOD 30 MIN: CPT | Performed by: STUDENT IN AN ORGANIZED HEALTH CARE EDUCATION/TRAINING PROGRAM

## 2024-06-13 PROCEDURE — 3078F DIAST BP <80 MM HG: CPT | Performed by: STUDENT IN AN ORGANIZED HEALTH CARE EDUCATION/TRAINING PROGRAM

## 2024-06-13 PROCEDURE — 1036F TOBACCO NON-USER: CPT | Performed by: STUDENT IN AN ORGANIZED HEALTH CARE EDUCATION/TRAINING PROGRAM

## 2024-06-13 PROCEDURE — 1159F MED LIST DOCD IN RCRD: CPT | Performed by: STUDENT IN AN ORGANIZED HEALTH CARE EDUCATION/TRAINING PROGRAM

## 2024-06-13 RX ORDER — NEBULIZER AND COMPRESSOR
EACH MISCELLANEOUS
Qty: 1 EACH | Refills: 0 | Status: SHIPPED | OUTPATIENT
Start: 2024-06-13

## 2024-06-13 RX ORDER — ALBUTEROL SULFATE 0.83 MG/ML
2.5 SOLUTION RESPIRATORY (INHALATION) 4 TIMES DAILY PRN
Qty: 90 ML | Refills: 3 | Status: SHIPPED | OUTPATIENT
Start: 2024-06-13 | End: 2025-06-13

## 2024-06-13 NOTE — PROGRESS NOTES
"Subjective   Patient ID: Rambo Wong is a 84 y.o. male who presents for Follow-up (ED follow-up.).        HPI  83 y/o male with h/o alcohol abuse (sober since the last 3 years as of june 2020 ) , HTN, HPL, alcohol induced dementia , former smoker with 40 pk year history       Recent ER visits for WOB , feels better after neb Rx    Quit 20 years ago , but smoked 2 Ppd / 10 years        Postoperative Diagnoses: surgery in May 2021  1. Nasal septal deviation   2. Bilateral inferior turbinate hypertrophy   3. Nasal obstruction      Operation/Procedure(s):   1. Septoplasty   2. Bilateral inferior turbinate submucous resection and outfracture       Visit Vitals  /59   Pulse 70   Ht 1.88 m (6' 2\")   Wt 112 kg (246 lb 9.6 oz)   SpO2 94%   BMI 31.66 kg/m²   Smoking Status Former   BSA 2.42 m²      No LMP for male patient.   Current Outpatient Medications   Medication Instructions    albuterol 90 mcg/actuation inhaler 2 puffs, inhalation, Every 4 hours PRN    albuterol 2.5 mg, nebulization, 4 times daily PRN    amLODIPine (NORVASC) 10 mg, oral, Daily    ascorbic acid (Vitamin C) 250 mg tablet Vitamin C TABS   Refills: 0       Active    atorvastatin (LIPITOR) 10 mg, oral, Nightly    azelastine (Astelin) 137 mcg (0.1 %) nasal spray 1 spray, Each Nostril, 2 times daily, Use in each nostril as directed    cetirizine (ZyrTEC) 10 mg tablet Take 1 tablet (10 mg) by mouth 2 times a day.    cholecalciferol (Vitamin D-3) 5,000 Units tablet 1 tablet, oral, Daily    cyanocobalamin, vitamin B-12, (VITAMIN B-12 ORAL) Take 1,000 mg by mouth once daily.    fluticasone (Flonase) 50 mcg/actuation nasal spray 2 sprays, Each Nostril, Daily    folic acid (Folvite) 800 mcg tablet 1 tablet (800 mcg) once daily.    ipratropium (Atrovent) 21 mcg (0.03 %) nasal spray Ipratropium Bromide 0.03 % Nasal Solution   Quantity: 60  Refills: 0        Start : 15-Jose-2022   Active    labetalol (Normodyne) 100 mg tablet TAKE 2 TABLETS EVERY MORNING .    " montelukast (Singulair) 10 mg tablet Take 1 tablet (10 mg) by mouth once daily at bedtime.    Mucus Relief  mg, oral, 2 times daily, As needed    nebulizer and compressor device Use the device  for breathing treatments    tamsulosin (Flomax) 0.4 mg 24 hr capsule 1 capsule, oral, Nightly      Social History     Tobacco Use    Smoking status: Former     Current packs/day: 0.00     Types: Cigarettes     Quit date:      Years since quittin.4    Smokeless tobacco: Never   Substance Use Topics    Alcohol use: Not Currently        Review of Systems    Constitutional : No feeling poorly / fevers/ chills / night sweats/ fatigue   Cardiovascular : No CP /Palpitations/ lower extremity edema / syncope   Respiratory : No Cough /BEAULIEU/Dyspnea at rest   Gastrointestinal : No abd pain / N/V  No bloody stools/ melena / constipation  Endo : No polyuria/polydipsia/ muscle weakness / sluggishness   CNS: No confusion / HA/ tingling/ numbness/ weakness of extremities  Psychiatric: No anxiety/ depression/ SI/HI    All other systems have been reviewed and are negative for complaint       Physical Exam    Constitutional : Vitals reviewed. Alert and in no distress  Cardiovascular : RRR, Normal S1, S2, No pericardial rub/ gallop, no peripheral edema   Pulmonary: No respiratory distress, CTAB   MSK : Normal gait and station , strength and tone   Skin: Warm to touch ,  normal skin turgor   Neurologic : CNs 2-12 grossly intact , no obvious FNDs  Psych : A,Ox3, normal mood and affect      Assessment/Plan   Diagnoses and all orders for this visit:  Mucopurulent chronic bronchitis (Multi)  -     albuterol 2.5 mg /3 mL (0.083 %) nebulizer solution; Take 3 mL (2.5 mg) by nebulization 4 times a day as needed for wheezing or shortness of breath.  -     nebulizer and compressor device; Use the device  for breathing treatments  Dementia associated with alcoholism without behavioral disturbance (Multi)  Dementia without behavioral  disturbance, psychotic disturbance, mood disturbance, or anxiety, unspecified dementia severity, unspecified dementia type (Multi)  Benign essential hypertension  -     CBC; Future  Stage 3b chronic kidney disease (Multi)  -     Comprehensive Metabolic Panel; Future  Screening for prostate cancer  -     Prostate Specific Antigen, Screen; Future  Medicare annual wellness visit, subsequent  -     Hemoglobin A1C; Future  Mixed hyperlipidemia  -     Lipid Panel; Future  -     TSH with reflex to Free T4 if abnormal; Future  Nasal obstruction  -     Referral to ENT; Future        85 y/o male with h/o alcohol abuse (sober since the last 3 years as of june 2020 ) , HTN, HPL, alcohol induced dementia , former smoker with 40 pk year history      Liquid alb and nebulizer   Significant nasal obstruction , h/o Bilateral inferior turbinate submucous resection and outfracture in 2021 , ent referral again     RTO in August for CPE            Conditions addressed and mgmt as noted above.  Pertinent labs, images/ imaging reports , chart review was done .   Age appropriate labs / labs for mgmt of chronic medical conditions ordered, further mgmt pending the results.

## 2024-06-20 ENCOUNTER — APPOINTMENT (OUTPATIENT)
Dept: UROLOGY | Facility: CLINIC | Age: 84
End: 2024-06-20
Payer: MEDICARE

## 2024-06-20 DIAGNOSIS — Z12.5 SCREENING FOR PROSTATE CANCER: ICD-10-CM

## 2024-06-20 DIAGNOSIS — N40.0 ENLARGED PROSTATE WITHOUT LOWER URINARY TRACT SYMPTOMS (LUTS): Primary | ICD-10-CM

## 2024-06-20 LAB
POC APPEARANCE, URINE: CLEAR
POC BILIRUBIN, URINE: NEGATIVE
POC BLOOD, URINE: NEGATIVE
POC COLOR, URINE: YELLOW
POC GLUCOSE, URINE: NEGATIVE MG/DL
POC KETONES, URINE: NEGATIVE MG/DL
POC LEUKOCYTES, URINE: NEGATIVE
POC NITRITE,URINE: NEGATIVE
POC PH, URINE: 6 PH
POC PROTEIN, URINE: ABNORMAL MG/DL
POC SPECIFIC GRAVITY, URINE: 1.02
POC UROBILINOGEN, URINE: 0.2 EU/DL

## 2024-06-20 PROCEDURE — 81003 URINALYSIS AUTO W/O SCOPE: CPT | Performed by: NURSE PRACTITIONER

## 2024-06-20 PROCEDURE — 99213 OFFICE O/P EST LOW 20 MIN: CPT | Performed by: NURSE PRACTITIONER

## 2024-06-20 PROCEDURE — 51798 US URINE CAPACITY MEASURE: CPT | Performed by: NURSE PRACTITIONER

## 2024-06-20 RX ORDER — TAMSULOSIN HYDROCHLORIDE 0.4 MG/1
0.4 CAPSULE ORAL NIGHTLY
Qty: 90 CAPSULE | Refills: 3 | Status: SHIPPED | OUTPATIENT
Start: 2024-06-20

## 2024-06-20 NOTE — PROGRESS NOTES
[facility]  Outpatient Clinic Note    Subjective   Rambo Wong is a 84 y.o. male    History of Present Illness   Patient presenting to clinic today for annual FUV. or follow-up on BPH. He was started on tamsulosin for slow urinary stream. He has since run out. Not bothered with DTF, NTF 1 to 2x.   Denies gross hematuria, dysuria, frequency, urgency, leaking, urinary tract infections, fevers, or chills. No ED concerns Hx of a left hydrocele for which he had seen Dr. Cooney. Scrotal ultrasound had confirmed the hydrocele. Since 2015 it has not bothered him or gotten any bigger.      May 2017 PSA was 2.24, PSA 05/24/2022 was 3.44 ng/mL     Urinalysis today: Negative   PVR 0 ml     Lab Results   Component Value Date    PSA 3.56 09/27/2023     Past Medical History and Surgical History   Past Medical History:   Diagnosis Date    Allergy status to unspecified drugs, medicaments and biological substances     History of allergy    Essential (primary) hypertension 06/22/2020    Benign essential hypertension    Left lower quadrant pain     Abdominal pain, LLQ (left lower quadrant)    Other amnesia     Memory loss    Other conditions influencing health status 08/20/2013    Alzheimer Disease Early Onset Uncomplicated    Other conditions influencing health status     Headache In The Forehead (Frontal)    Personal history of other diseases of the circulatory system     History of hypertension    Personal history of other endocrine, nutritional and metabolic disease     History of hypercholesterolemia    Personal history of other mental and behavioral disorders 12/01/2017    History of depression    Personal history of other specified conditions 05/15/2014    History of dizziness    Personal history of other specified conditions 12/01/2017    History of chest pain    Personal history of other specified conditions     History of headache     Past Surgical History:   Procedure Laterality Date    ANTERIOR CRUCIATE LIGAMENT  REPAIR  04/18/2014    Primary Repair Of Knee Ligament Cruciate Anterior    CT ANGIO NECK  3/4/2023    CT NECK ANGIO W AND WO IV CONTRAST [facility]    CT HEAD ANGIO W AND WO IV CONTRAST  3/4/2023    CT HEAD ANGIO W AND WO IV CONTRAST [facility]    HERNIA REPAIR  04/18/2014    Inguinal Hernia Repair    OTHER SURGICAL HISTORY  01/04/2019    Cataract surgery    OTHER SURGICAL HISTORY  04/18/2014    Closed Treatment Of Bimalleolar Ankle Fracture       Medications  Current Outpatient Medications on File Prior to Visit   Medication Sig Dispense Refill    albuterol 2.5 mg /3 mL (0.083 %) nebulizer solution Take 3 mL (2.5 mg) by nebulization 4 times a day as needed for wheezing or shortness of breath. 90 mL 3    albuterol 90 mcg/actuation inhaler Inhale 2 puffs every 4 hours if needed for wheezing. 25.5 g 0    amLODIPine (Norvasc) 10 mg tablet Take 1 tablet (10 mg) by mouth once daily. 90 tablet 0    ascorbic acid (Vitamin C) 250 mg tablet Vitamin C TABS   Refills: 0       Active      atorvastatin (Lipitor) 10 mg tablet Take 1 tablet (10 mg) by mouth once daily at bedtime. 90 tablet 3    azelastine (Astelin) 137 mcg (0.1 %) nasal spray Administer 1 spray into each nostril 2 times a day. Use in each nostril as directed      cetirizine (ZyrTEC) 10 mg tablet Take 1 tablet (10 mg) by mouth 2 times a day.      cholecalciferol (Vitamin D-3) 5,000 Units tablet Take 1 tablet (5,000 Units) by mouth once daily.      cyanocobalamin, vitamin B-12, (VITAMIN B-12 ORAL) Take 1,000 mg by mouth once daily.      fluticasone (Flonase) 50 mcg/actuation nasal spray Administer 2 sprays into each nostril once daily.      folic acid (Folvite) 800 mcg tablet 1 tablet (800 mcg) once daily.      ipratropium (Atrovent) 21 mcg (0.03 %) nasal spray Ipratropium Bromide 0.03 % Nasal Solution   Quantity: 60  Refills: 0        Start : 15-Jose-2022   Active      labetalol (Normodyne) 100 mg tablet TAKE 2 TABLETS EVERY MORNING . (Patient taking differently: Take 1  tablet (100 mg) by mouth 2 times a day. TAKE 2 TABLETS EVERY MORNING .) 270 tablet 3    montelukast (Singulair) 10 mg tablet Take 1 tablet (10 mg) by mouth once daily at bedtime.      Mucus Relief  mg 12 hr tablet Take 1 tablet (600 mg) by mouth 2 times a day. As needed      nebulizer and compressor device Use the device  for breathing treatments 1 each 0    tamsulosin (Flomax) 0.4 mg 24 hr capsule Take 1 capsule (0.4 mg) by mouth once daily at bedtime.       No current facility-administered medications on file prior to visit.       Objective   Physicial Exam  General: Well developed, well nourished, alert and cooperative, appears in no acute distress  Eyes: Non-injected conjunctiva, sclera clear, no proptosis  Cardiac: Extremities are warm and well perfused. No edema, cyanosis or pallor.   Lungs: Breathing is easy, non-labored. Speaking in clear and complete sentences. Normal diaphragmatic movement.  MSK: Ambulatory with steady gait, unassisted  Neuro: alert and oriented to person, place and time  Psych: Demonstrates good judgement and reason, without hallucinations, abnormal affect or abnormal behaviors.  Skin: no obvious lesions, no rashes.    Review of Systems  All other systems have been reviewed and are negative for complaint.      Assessment and Plan     All questions and concerns were addressed. Patient verbalizes understanding and has no other questions at this time. You are able to have email access to your chart. You can sign into [employer] or add the [employer] on your smart phone to review today's visit, laboratory work and imaging.   If you have any questions about your care, do not hesitate to call and leave a message, we return calls in a timely manner.    Renita Luna-- POLO KELLEY  Office Phone:  [phone]

## 2024-06-28 ENCOUNTER — APPOINTMENT (OUTPATIENT)
Dept: OTOLARYNGOLOGY | Facility: CLINIC | Age: 84
End: 2024-06-28
Payer: MEDICARE

## 2024-06-28 VITALS — BODY MASS INDEX: 30.8 KG/M2 | HEIGHT: 74 IN | WEIGHT: 240 LBS

## 2024-06-28 DIAGNOSIS — J34.89 NASAL OBSTRUCTION: ICD-10-CM

## 2024-06-28 DIAGNOSIS — J34.3 HYPERTROPHY OF BOTH INFERIOR NASAL TURBINATES: Primary | ICD-10-CM

## 2024-06-28 DIAGNOSIS — J30.9 ALLERGIC RHINITIS, UNSPECIFIED SEASONALITY, UNSPECIFIED TRIGGER: ICD-10-CM

## 2024-06-28 DIAGNOSIS — R09.82 POST-NASAL DRAINAGE: ICD-10-CM

## 2024-06-28 PROCEDURE — 99204 OFFICE O/P NEW MOD 45 MIN: CPT | Performed by: OTOLARYNGOLOGY

## 2024-06-28 PROCEDURE — 1160F RVW MEDS BY RX/DR IN RCRD: CPT | Performed by: OTOLARYNGOLOGY

## 2024-06-28 PROCEDURE — 31231 NASAL ENDOSCOPY DX: CPT | Performed by: OTOLARYNGOLOGY

## 2024-06-28 PROCEDURE — 1159F MED LIST DOCD IN RCRD: CPT | Performed by: OTOLARYNGOLOGY

## 2024-06-28 RX ORDER — FLUTICASONE PROPIONATE 50 MCG
1 SPRAY, SUSPENSION (ML) NASAL 2 TIMES DAILY
Qty: 48 G | Refills: 3 | Status: SHIPPED | OUTPATIENT
Start: 2024-06-28 | End: 2025-06-28

## 2024-06-28 RX ORDER — AZELASTINE 1 MG/ML
2 SPRAY, METERED NASAL 2 TIMES DAILY
Qty: 90 ML | Refills: 3 | Status: SHIPPED | OUTPATIENT
Start: 2024-06-28 | End: 2025-06-28

## 2024-06-28 ASSESSMENT — PATIENT HEALTH QUESTIONNAIRE - PHQ9
SUM OF ALL RESPONSES TO PHQ9 QUESTIONS 1 AND 2: 0
2. FEELING DOWN, DEPRESSED OR HOPELESS: NOT AT ALL
1. LITTLE INTEREST OR PLEASURE IN DOING THINGS: NOT AT ALL

## 2024-06-28 NOTE — PROGRESS NOTES
Chief Complaint:  Nasal congestion    History Of Present Illness:    Rambo Wong presents as a new patient to me.  He has been followed by my partner, Dr. Varghese, who last saw him June 17, 2021.  He underwent nasal airway surgery with my partner May 17, 2021 including septoplasty and bilateral inferior turbinate reductions.  In the postoperative setting, he did well but his congestion symptoms slowly recurred.  He is currently using cetirizine twice a day and fluticasone and azelastine as needed.    He mentioned having some episodes of shortness of breath and has presented to the emergency department for these evaluations.  It can occur around once per month and he questions whether or not is being driven by difficulty breathing through his nose.    Main Symptoms:  Patient does not have anterior nasal drainage.     Patient has  posterior nasal drainage.    Patient has nasal airway obstruction.   Bilateral; has been present for months  Patient does not have  facial pain.    Patient does not have  facial pressure.    Patient does not have decreased sense of smell.   Associated Symptoms:   Patient does not have  headaches.    Patient has throat clearing.    Patient does not have coughing.    Patient does not have dysphonia.   Patient has nasal bleeding.  Once per week; minor    Medications currently on for sinonasal symptoms:  Cetirizine BID; Fluticasone PRN; Azelastine PRN  Medications tried in the past for sinonasal symptoms:  Mucinex, ipratropium, Singulair    Other Pertinent Medical Conditions:   Patient does not have asthma.    Patient does not have aspirin sensitivity.    Patient does not have migraines.    Patient has history of allergy testing. When: (+) about 20 years ago   Patient has history of sinus surgery.  x1 [facility]  Patient does not have history of nasal fracture.    Patient does not have heartburn.    The patient does not have imaging of sinuses.     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)    Dermatitis    Deviated nasal septum    Difficulty breathing    Dizziness    Elevated serum creatinine    Enlarged prostate without lower urinary tract symptoms (luts)    Glaucoma of both eyes    Glaucoma simplex, moderate stage    Glaucoma suspect    Hydrocele    Hypercholesterolemia    Hyperlipidemia    Incomplete bladder emptying    Lesion of skin of face    Dementia (Multi)    Memory impairment    Mild cognitive impairment    Microscopic hematuria    Muscle strain of gluteal region, initial encounter    Nasal drainage    Hypertrophy of inferior nasal turbinate    Nasal obstruction    Pseudophakia of left eye    Pseudophakia of right eye    Renal insufficiency    Skipped heart beats    Vitamin B12 deficiency    Vitamin D deficiency    Tamsulosin-associated floppy iris    Weakness generalized    BPH associated with nocturia    Elevated PSA    Hypersomnolence    Mass on back    KHAI (obstructive sleep apnea)    Physical deconditioning    URI (upper respiratory infection)    Shortness of breath    Vertigo    Floppy iris syndrome    Chest pain      Past Medical History:  He has a past medical history of Allergy status to unspecified drugs, medicaments and biological substances, Essential (primary) hypertension (06/22/2020), Left lower quadrant pain, Other amnesia, Other conditions influencing health status (08/20/2013), Other conditions influencing health status, Personal history of other diseases of the circulatory system, Personal history of other endocrine, nutritional and metabolic disease, Personal history of other mental  and behavioral disorders (12/01/2017), Personal history of other specified conditions (05/15/2014), Personal history of other specified conditions (12/01/2017), and Personal history of other specified conditions.    Surgical History:  He has a past surgical history that includes Other surgical history (01/04/2019); Hernia repair (04/18/2014); Other surgical history (04/18/2014); Anterior cruciate ligament repair (04/18/2014); CT angio head w and wo IV contrast (3/4/2023); and CT angio neck (3/4/2023).     Family History:  Family History   Problem Relation Name Age of Onset    Alcohol abuse Mother      Dementia Mother      Other (unknown cause of morbidity or mortality) Father      Breast cancer Sister      Hypertension Other      Breast cancer Other      Cancer Other       Social History:  He reports that he quit smoking about 31 years ago. His smoking use included cigarettes. He has never used smokeless tobacco. He reports that he does not currently use alcohol. He reports that he does not use drugs.     Allergies:  Pollen extracts    Current Meds:    Current Outpatient Medications:     albuterol 2.5 mg /3 mL (0.083 %) nebulizer solution, Take 3 mL (2.5 mg) by nebulization 4 times a day as needed for wheezing or shortness of breath., Disp: 90 mL, Rfl: 3    amLODIPine (Norvasc) 10 mg tablet, Take 1 tablet (10 mg) by mouth once daily., Disp: 90 tablet, Rfl: 0    ascorbic acid (Vitamin C) 250 mg tablet, Vitamin C TABS  Refills: 0     Active, Disp: , Rfl:     atorvastatin (Lipitor) 10 mg tablet, Take 1 tablet (10 mg) by mouth once daily at bedtime., Disp: 90 tablet, Rfl: 3    azelastine (Astelin) 137 mcg (0.1 %) nasal spray, Administer 1 spray into each nostril 2 times a day. Use in each nostril as directed, Disp: , Rfl:     cetirizine (ZyrTEC) 10 mg tablet, Take 1 tablet (10 mg) by mouth 2 times a day., Disp: , Rfl:     cholecalciferol (Vitamin D-3) 5,000 Units tablet, Take 1 tablet (5,000 Units) by mouth once daily.,  "Disp: , Rfl:     cyanocobalamin, vitamin B-12, (VITAMIN B-12 ORAL), Take 1,000 mg by mouth once daily., Disp: , Rfl:     fluticasone (Flonase) 50 mcg/actuation nasal spray, Administer 2 sprays into each nostril once daily., Disp: , Rfl:     folic acid (Folvite) 800 mcg tablet, 1 tablet (800 mcg) once daily., Disp: , Rfl:     ipratropium (Atrovent) 21 mcg (0.03 %) nasal spray, Ipratropium Bromide 0.03 % Nasal Solution  Quantity: 60  Refills: 0      Start : 15-Jose-2022  Active, Disp: , Rfl:     labetalol (Normodyne) 100 mg tablet, TAKE 2 TABLETS EVERY MORNING . (Patient taking differently: Take 1 tablet (100 mg) by mouth 2 times a day. TAKE 2 TABLETS EVERY MORNING .), Disp: 270 tablet, Rfl: 3    montelukast (Singulair) 10 mg tablet, Take 1 tablet (10 mg) by mouth once daily at bedtime., Disp: , Rfl:     Mucus Relief  mg 12 hr tablet, Take 1 tablet (600 mg) by mouth 2 times a day. As needed, Disp: , Rfl:     nebulizer and compressor device, Use the device  for breathing treatments, Disp: 1 each, Rfl: 0    tamsulosin (Flomax) 0.4 mg 24 hr capsule, Take 1 capsule (0.4 mg) by mouth once daily at bedtime., Disp: 90 capsule, Rfl: 3    albuterol 90 mcg/actuation inhaler, Inhale 2 puffs every 4 hours if needed for wheezing., Disp: 25.5 g, Rfl: 0    Vitals:  Visit Vitals  Ht 1.88 m (6' 2\")   Wt 109 kg (240 lb)   BMI 30.81 kg/m²   Smoking Status Former   BSA 2.39 m²      Physical Exam:  Nose: On external exam there are neither lesions nor asymmetry of the nasal tip/dorsum. On anterior rhinoscopy, visualization posteriorly is limited on anterior examination. For this reason, to adequately evaluate posteriorly for masses, source of epistaxis, polypoid disease, debridement, and/or signs of infections, nasal endoscopy is indicated.  (Please see procedure below.)    SINONASAL ENDOSCOPY (CPT 52205): To better evaluate the patient's symptoms, sinonasal endoscopy is indicated.  After discussion of risks and benefits, and topical " decongestion and anesthesia,an endoscope was used to perform nasal endoscopy on each side.  A time out identifying the patient, the procedure, the location of the procedure and any concerns was performed prior to beginning the procedure.    Findings:  Examination of each nasal cavity revealed inferior turbinate hypertrophy.  The middle meatus was normal bilaterally but I could not advance past this to view the sphenoethmoid recess on either side secondary to patient discomfort.    With elevation of his nasal tip his breathing significantly improved.    Results/Data:  I personally reviewed an MRI of his brain from March 5, 2023.  There were inflammatory changes within each ethmoid cavity worse on the left.  There were inflammatory changes along the base of the left maxillary.  I personally reviewed a CT head from January 23, 2013.  There were no significant inflammatory changes throughout the visualized sinuses.  The base of each maxillary was excluded on the study.  I reviewed a CT angio of his brain from March 4, 2023 demonstrating inflammatory changes within each ethmoid cavity.    I reviewed an emergency department record from May 23, 2024.  He presented with shortness of breath.  He underwent a chest x-ray that demonstrated no acute pulmonary abnormality.  Coronavirus testing was negative.  I reviewed 2 additional emergency department notes from June 12, 2023 and May 20, 2023 both outlining a chief complaint of shortness of breath.  I reviewed a note from his primary care provider from June 16, 2023 where he was treated for bronchospasm.    Provider Impressions:  1.  Nasal airway obstruction, inferior turbinate hypertrophy, history of previous nasal airway surgery with Dr. Varghese 2021; benefit with stenting  2.  Postnasal drainage  3.  Throat clearing  4.  Epistaxis  5.  Allergic rhinitis    Discussion:  Rambo Wong and I discussed options today.  His biggest concern was nasal airway obstruction.  On gentle  elevation of his nasal tip he had significant improvement in his breathing.  I am very suspicious that a valve component particularly on the left is driving some of his congestion symptoms and we discussed the mechanism of this.  If he wished to consider surgical options for this, I would recommend assessment with one of my plastic surgery partners, Dr. Magaña.  He was comfortable holding on this in the short-term but it is certainly something that we can address again moving forward.    We discussed his current medical therapies and that, particularly topical steroid therapy will work better if used consistently.  He was comfortable going on a trial of azelastine and fluticasone more consistently and prescriptions were provided.  We discussed appropriate administration technique.    I asked him to follow-up with me in 2 to 3 months.  If his symptoms persist, we could certainly consider additional intranasal medical therapy such as a rinse based steroid.  He could consider nasal cones as something that may provide him benefit at night.  If he wished to consider surgical options, I would recommend a noncontrast CT sinus after completion of oral antibiotic therapy as there were findings within his sinuses on imaging dating back to March of last year.    We also had a thoughtful discussion today that nasal congestion is a quality-of-life issue and would be unlikely to be driving shortness of breath.  I think he has very reasonable options medically to improve his breathing but he will need to decide whether or not surgical options are appropriate.  He also could get reassessed with allergy but this would be walking down an immunotherapy pathway and again he would need to decide whether or not this would be something that he would like to pursue.    All questions were answered.    Signature:  David Sawyer MD

## 2024-08-06 DIAGNOSIS — I10 PRIMARY HYPERTENSION: ICD-10-CM

## 2024-08-06 RX ORDER — AMLODIPINE BESYLATE 10 MG/1
10 TABLET ORAL DAILY
Qty: 90 TABLET | Refills: 3 | Status: SHIPPED | OUTPATIENT
Start: 2024-08-06

## 2024-08-18 DIAGNOSIS — E78.5 HYPERLIPIDEMIA, UNSPECIFIED HYPERLIPIDEMIA TYPE: ICD-10-CM

## 2024-08-19 RX ORDER — ATORVASTATIN CALCIUM 10 MG/1
10 TABLET, FILM COATED ORAL NIGHTLY
Qty: 90 TABLET | Refills: 3 | Status: SHIPPED | OUTPATIENT
Start: 2024-08-19

## 2024-08-26 ENCOUNTER — LAB (OUTPATIENT)
Dept: LAB | Facility: LAB | Age: 84
End: 2024-08-26
Payer: MEDICARE

## 2024-08-26 DIAGNOSIS — I10 BENIGN ESSENTIAL HYPERTENSION: ICD-10-CM

## 2024-08-26 DIAGNOSIS — Z12.5 SCREENING FOR PROSTATE CANCER: ICD-10-CM

## 2024-08-26 DIAGNOSIS — N18.32 STAGE 3B CHRONIC KIDNEY DISEASE (MULTI): ICD-10-CM

## 2024-08-26 DIAGNOSIS — E78.2 MIXED HYPERLIPIDEMIA: ICD-10-CM

## 2024-08-26 DIAGNOSIS — Z00.00 MEDICARE ANNUAL WELLNESS VISIT, SUBSEQUENT: ICD-10-CM

## 2024-08-26 LAB
ALBUMIN SERPL BCP-MCNC: 3.8 G/DL (ref 3.4–5)
ALP SERPL-CCNC: 59 U/L (ref 33–136)
ALT SERPL W P-5'-P-CCNC: 15 U/L (ref 10–52)
ANION GAP SERPL CALC-SCNC: 13 MMOL/L (ref 10–20)
AST SERPL W P-5'-P-CCNC: 15 U/L (ref 9–39)
BILIRUB SERPL-MCNC: 0.9 MG/DL (ref 0–1.2)
BUN SERPL-MCNC: 13 MG/DL (ref 6–23)
CALCIUM SERPL-MCNC: 9.6 MG/DL (ref 8.6–10.6)
CHLORIDE SERPL-SCNC: 101 MMOL/L (ref 98–107)
CHOLEST SERPL-MCNC: 140 MG/DL (ref 0–199)
CHOLESTEROL/HDL RATIO: 4.2
CO2 SERPL-SCNC: 28 MMOL/L (ref 21–32)
CREAT SERPL-MCNC: 2.11 MG/DL (ref 0.5–1.3)
EGFRCR SERPLBLD CKD-EPI 2021: 30 ML/MIN/1.73M*2
ERYTHROCYTE [DISTWIDTH] IN BLOOD BY AUTOMATED COUNT: 15 % (ref 11.5–14.5)
EST. AVERAGE GLUCOSE BLD GHB EST-MCNC: 120 MG/DL
GLUCOSE SERPL-MCNC: 88 MG/DL (ref 74–99)
HBA1C MFR BLD: 5.8 %
HCT VFR BLD AUTO: 45.7 % (ref 41–52)
HDLC SERPL-MCNC: 33.4 MG/DL
HGB BLD-MCNC: 15 G/DL (ref 13.5–17.5)
LDLC SERPL CALC-MCNC: 90 MG/DL
MCH RBC QN AUTO: 28.5 PG (ref 26–34)
MCHC RBC AUTO-ENTMCNC: 32.8 G/DL (ref 32–36)
MCV RBC AUTO: 87 FL (ref 80–100)
NON HDL CHOLESTEROL: 107 MG/DL (ref 0–149)
NRBC BLD-RTO: 0 /100 WBCS (ref 0–0)
PLATELET # BLD AUTO: 272 X10*3/UL (ref 150–450)
POTASSIUM SERPL-SCNC: 4.3 MMOL/L (ref 3.5–5.3)
PROT SERPL-MCNC: 7.1 G/DL (ref 6.4–8.2)
PSA SERPL-MCNC: 6.89 NG/ML
PSA SERPL-MCNC: 6.89 NG/ML
RBC # BLD AUTO: 5.26 X10*6/UL (ref 4.5–5.9)
SODIUM SERPL-SCNC: 138 MMOL/L (ref 136–145)
TRIGL SERPL-MCNC: 81 MG/DL (ref 0–149)
TSH SERPL-ACNC: 1.46 MIU/L (ref 0.44–3.98)
VLDL: 16 MG/DL (ref 0–40)
WBC # BLD AUTO: 5.9 X10*3/UL (ref 4.4–11.3)

## 2024-08-26 PROCEDURE — 36415 COLL VENOUS BLD VENIPUNCTURE: CPT

## 2024-08-27 ENCOUNTER — APPOINTMENT (OUTPATIENT)
Dept: PRIMARY CARE | Facility: CLINIC | Age: 84
End: 2024-08-27
Payer: MEDICARE

## 2024-08-27 VITALS
BODY MASS INDEX: 33.55 KG/M2 | DIASTOLIC BLOOD PRESSURE: 70 MMHG | HEART RATE: 77 BPM | OXYGEN SATURATION: 93 % | SYSTOLIC BLOOD PRESSURE: 117 MMHG | HEIGHT: 74 IN | WEIGHT: 261.4 LBS

## 2024-08-27 DIAGNOSIS — R97.20 ELEVATED PSA: Primary | ICD-10-CM

## 2024-08-27 DIAGNOSIS — E78.2 MIXED HYPERLIPIDEMIA: ICD-10-CM

## 2024-08-27 DIAGNOSIS — Z00.00 MEDICARE ANNUAL WELLNESS VISIT, SUBSEQUENT: Primary | ICD-10-CM

## 2024-08-27 DIAGNOSIS — N18.32 STAGE 3B CHRONIC KIDNEY DISEASE (MULTI): ICD-10-CM

## 2024-08-27 DIAGNOSIS — I10 PRIMARY HYPERTENSION: ICD-10-CM

## 2024-08-27 DIAGNOSIS — R42 DIZZINESSES: ICD-10-CM

## 2024-08-27 DIAGNOSIS — R73.03 PREDIABETES: ICD-10-CM

## 2024-08-27 DIAGNOSIS — Z71.3 DIETARY COUNSELING: ICD-10-CM

## 2024-08-27 PROCEDURE — 1159F MED LIST DOCD IN RCRD: CPT | Performed by: STUDENT IN AN ORGANIZED HEALTH CARE EDUCATION/TRAINING PROGRAM

## 2024-08-27 PROCEDURE — 3078F DIAST BP <80 MM HG: CPT | Performed by: STUDENT IN AN ORGANIZED HEALTH CARE EDUCATION/TRAINING PROGRAM

## 2024-08-27 PROCEDURE — 1170F FXNL STATUS ASSESSED: CPT | Performed by: STUDENT IN AN ORGANIZED HEALTH CARE EDUCATION/TRAINING PROGRAM

## 2024-08-27 PROCEDURE — 1160F RVW MEDS BY RX/DR IN RCRD: CPT | Performed by: STUDENT IN AN ORGANIZED HEALTH CARE EDUCATION/TRAINING PROGRAM

## 2024-08-27 PROCEDURE — 1124F ACP DISCUSS-NO DSCNMKR DOCD: CPT | Performed by: STUDENT IN AN ORGANIZED HEALTH CARE EDUCATION/TRAINING PROGRAM

## 2024-08-27 PROCEDURE — 99215 OFFICE O/P EST HI 40 MIN: CPT | Performed by: STUDENT IN AN ORGANIZED HEALTH CARE EDUCATION/TRAINING PROGRAM

## 2024-08-27 PROCEDURE — 93000 ELECTROCARDIOGRAM COMPLETE: CPT | Performed by: STUDENT IN AN ORGANIZED HEALTH CARE EDUCATION/TRAINING PROGRAM

## 2024-08-27 PROCEDURE — 3074F SYST BP LT 130 MM HG: CPT | Performed by: STUDENT IN AN ORGANIZED HEALTH CARE EDUCATION/TRAINING PROGRAM

## 2024-08-27 PROCEDURE — G0439 PPPS, SUBSEQ VISIT: HCPCS | Performed by: STUDENT IN AN ORGANIZED HEALTH CARE EDUCATION/TRAINING PROGRAM

## 2024-08-27 PROCEDURE — 1036F TOBACCO NON-USER: CPT | Performed by: STUDENT IN AN ORGANIZED HEALTH CARE EDUCATION/TRAINING PROGRAM

## 2024-08-27 ASSESSMENT — ACTIVITIES OF DAILY LIVING (ADL)
MANAGING_FINANCES: INDEPENDENT
TAKING_MEDICATION: INDEPENDENT
BATHING: INDEPENDENT
MANAGING_FINANCES: NEEDS ASSISTANCE
GROCERY_SHOPPING: INDEPENDENT
GROCERY_SHOPPING: NEEDS ASSISTANCE
DOING_HOUSEWORK: INDEPENDENT
DRESSING: NEEDS ASSISTANCE
DRESSING: INDEPENDENT

## 2024-08-27 NOTE — PROGRESS NOTES
Subjective   Reason for Visit: Rambo Wong is an 84 y.o. male here for a Medicare Wellness visit.     Past Medical, Surgical, and Family History reviewed and updated in chart.    Reviewed all medications by prescribing practitioner or clinical pharmacist (such as prescriptions, OTCs, herbal therapies and supplements) and documented in the medical record.    HPI    83 y/o male with h/o alcohol abuse (sober since the last 3 years as of june 2020 ) , HTN, HPL, alcohol induced dementia , former smoker with 40 pk year history         Patient Care Team:  Sahra De Leon MD as PCP - General (Family Medicine)  Sahra De Leon MD as PCP - [facility]     Current Outpatient Medications   Medication Instructions    albuterol 90 mcg/actuation inhaler 2 puffs, inhalation, Every 4 hours PRN    albuterol 2.5 mg, nebulization, 4 times daily PRN    amLODIPine (NORVASC) 10 mg, oral, Daily    ascorbic acid (Vitamin C) 250 mg tablet Vitamin C TABS   Refills: 0       Active    atorvastatin (LIPITOR) 10 mg, oral, Nightly    azelastine (Astelin) 137 mcg (0.1 %) nasal spray 2 sprays, Each Nostril, 2 times daily, Use in each nostril as directed    cetirizine (ZyrTEC) 10 mg tablet Take 1 tablet (10 mg) by mouth 2 times a day.    cholecalciferol (Vitamin D-3) 5,000 Units tablet 1 tablet, oral, Daily    cyanocobalamin, vitamin B-12, (VITAMIN B-12 ORAL) Take 1,000 mg by mouth once daily.    fluticasone (Flonase) 50 mcg/actuation nasal spray 1 spray, Each Nostril, 2 times daily, Shake gently. Before first use, prime pump. After use, clean tip and replace cap.    folic acid (Folvite) 800 mcg tablet 1 tablet (800 mcg) once daily.    ipratropium (Atrovent) 21 mcg (0.03 %) nasal spray Ipratropium Bromide 0.03 % Nasal Solution   Quantity: 60  Refills: 0        Start : 15-Jose-2022   Active    labetalol (Normodyne) 100 mg tablet TAKE 2 TABLETS EVERY MORNING .    montelukast (Singulair) 10 mg tablet Take 1 tablet (10  "mg) by mouth once daily at bedtime.    Mucus Relief  mg, oral, 2 times daily, As needed    nebulizer and compressor device Use the device  for breathing treatments    tamsulosin (FLOMAX) 0.4 mg, oral, Nightly        Social History     Tobacco Use    Smoking status: Former     Current packs/day: 0.00     Types: Cigarettes     Quit date:      Years since quittin.6    Smokeless tobacco: Never   Substance Use Topics    Alcohol use: Not Currently        Review of Systems  Constitutional: no chills, no fever and no night sweats.     Eyes: no blurred vision and no eyesight problems.     ENT: no hearing loss, no nasal congestion, no nasal discharge, no hoarseness and no sore throat.     Cardiovascular: no chest pain, no intermittent leg claudication, no lower extremity edema, no palpitations and no syncope.     Respiratory: no cough, no shortness of breath during exertion, no shortness of breath at rest and no wheezing.     Gastrointestinal: no abdominal pain, no constipation, no blood in stools, no diarrhea, no melena, no nausea, no rectal pain and no vomiting.     Genitourinary: no dysuria, no change in urinary frequency, no urinary hesitancy and no feelings of urinary urgency.     Musculoskeletal: no arthralgias, no back pain and no myalgias.     Integumentary: no new skin lesions and no rashes.     Neurological: no difficulty walking, no headache, no limb weakness, no numbness and no tingling.     Psychiatric: no anxiety, no depression, no anhedonia and no substance use disorders.     Endocrine: no recent weight gain and no recent weight loss.     Hematologic/Lymphatic: no tendency for easy bruising and no swollen glands.          All other systems have been reviewed and are negative for complaint.      Objective   Vitals:  /70   Pulse 77   Ht 1.88 m (6' 2\")   Wt 119 kg (261 lb 6.4 oz)   SpO2 93%   BMI 33.56 kg/m²       Physical Exam  Constitutional: Alert and in no acute distress. Well " developed, well nourished.     Eyes: Normal external exam. Pupils were equal in size, round, reactive to light (PERRL) with normal accommodation and extraocular movements intact (EOMI).     Ears, Nose, Mouth, and Throat: External inspection of ears and nose: Normal.  Otoscopic examination: Normal.      Neck: No neck mass was observed. Supple.     Cardiovascular: Heart rate and rhythm were normal, normal S1 and S2, no gallops, no murmurs and no pericardial rub    Pulmonary: No respiratory distress. Clear bilateral breath sounds.     Abdomen: Soft nontender; no abdominal mass palpated. No organomegaly.   Midline ventral hernia     Musculoskeletal: No joint swelling seen, normal movements of all extremities. Range of motion: Normal.  Muscle strength/tone: Normal.    .    Neurologic: Deep tendon reflexes were 2+ and symmetric. Sensation: Normal.     Psychiatric: Judgment and insight: Intact. Mood and affect: Normal.    .    Assessment/Plan   Problem List Items Addressed This Visit       CKD (chronic kidney disease) stage 3, GFR 30-59 ml/min (Multi)    Relevant Orders    Basic Metabolic Panel    Hyperlipidemia     Other Visit Diagnoses       Medicare annual wellness visit, subsequent    -  Primary    Primary hypertension        Prediabetes        Dizzinesses        Relevant Orders    ECG 12 lead (Clinic Performed) (Completed)    Referral to Cardiology    Dietary counseling              83 y/o male with h/o alcohol abuse (sober since the last 3 years as of june 2020 ) , HTN, HPL, alcohol induced dementia , former smoker with 40 pk year history      Is here with his wife      MMSE today 26/30  Chronic medical conditions: Reviewed , assessed , stable.  - HTN  - HPL   - CKD stage 3 : est with Nephro   Declined noted in GFR , rpt in few months   - decreased ambulation :  - 15lb weight gain in 2 months : excessive consumption if ice cream , snickers, candy   Dietary changes discussed   - Dizziness : postural vs arrythmias    RRR today on auscultation   EKG today - sinus rhythm with first degree aV block   EP referral      Multiple issues with high complexity = [id number]  RTO in dec     **Patient Discussion/Summary        Influenza: influenza vaccine  , in the fall   Pneumovax 23/Prevnar 15: Pneumovax 23/Prevnar 15 vaccine was previously given.   Prevnar 20: Prevnar 20 vaccine was previously given.   Shingles Vaccine: Shingles vaccine was previously given.   Prostate cancer screening: Screening   current   Colorectal Cancer Screening: screening not indicated.   Abdominal Aortic Aneurysm screening: screening not indicated.   HIV screening: screening not indicated          This note is intended for the physician writing it, as well as to communicate findings to other healthcare professionals. These notes use medical lexicon that may be misunderstood by non medical persons. Therefore, interpretations of medical notes and terminology should be approached with caution.

## 2024-08-29 ENCOUNTER — APPOINTMENT (OUTPATIENT)
Dept: PRIMARY CARE | Facility: CLINIC | Age: 84
End: 2024-08-29
Payer: MEDICARE

## 2024-09-09 NOTE — PROGRESS NOTES
Chief Complaint:  1.  Nasal airway obstruction, inferior turbinate hypertrophy, history of previous nasal airway surgery with Dr. Varghese 2021; benefit with stenting  2.  Postnasal drainage  3.  Throat clearing  4.  Epistaxis  5.  Allergic rhinitis    History Of Present Illness:  Rambo Wong presents since last being seen 6/28/24.     He has done well since his last assessment.  He has some baseline symptoms as outlined below but is not specifically bothered by them.  He is using fluticasone and azelastine as needed and not consistently.    Main Symptoms:  Patient does not have anterior nasal drainage.  Patient has posterior nasal drainage.    Patient has nasal airway obstruction.  Patient does not have facial pain.  Patient does not have facial pressure.  Patient does not have decreased sense of smell.   Associated Symptoms:  Patient does not have headaches.  Patient has throat clearing.  Secondary to PND.  Patient has coughing.  Secondary to PND.  Patient does not have dysphonia.  Patient has nasal bleeding.  Intermittent.      Medications currently on for sinonasal symptoms:  Cetirizine BID; Fluticasone not common; Azelastine not common     Active Problems:  Patient Active Problem List   Diagnosis    Allergic rhinitis    Chronic rhinitis    Anxiety    Benign essential hypertension    Chronic alcohol use    CKD (chronic kidney disease) stage 3, GFR 30-59 ml/min (Multi)    CKD stage G3b/A1, GFR 30-44 and albumin creatinine ratio <30 mg/g (Multi)    Combined forms of age-related cataract of left eye    Dementia associated with alcoholism without behavioral disturbance (Multi)    Dermatitis    Deviated nasal septum    Difficulty breathing    Dizziness    Elevated serum creatinine    Enlarged prostate without lower urinary tract symptoms (luts)    Glaucoma of both eyes    Glaucoma simplex, moderate stage    Glaucoma suspect    Hydrocele    Hypercholesterolemia    Hyperlipidemia    Incomplete bladder emptying     Lesion of skin of face    Dementia (Multi)    Memory impairment    Mild cognitive impairment    Microscopic hematuria    Muscle strain of gluteal region, initial encounter    Nasal drainage    Hypertrophy of inferior nasal turbinate    Nasal obstruction    Pseudophakia of left eye    Pseudophakia of right eye    Renal insufficiency    Skipped heart beats    Vitamin B12 deficiency    Vitamin D deficiency    Tamsulosin-associated floppy iris    Weakness generalized    BPH associated with nocturia    Elevated PSA    Hypersomnolence    Mass on back    KHAI (obstructive sleep apnea)    Physical deconditioning    URI (upper respiratory infection)    Shortness of breath    Vertigo    Floppy iris syndrome    Chest pain     Past Medical History:  He has a past medical history of Allergy status to unspecified drugs, medicaments and biological substances, Essential (primary) hypertension (06/22/2020), Left lower quadrant pain, Other amnesia, Other conditions influencing health status (08/20/2013), Other conditions influencing health status, Personal history of other diseases of the circulatory system, Personal history of other endocrine, nutritional and metabolic disease, Personal history of other mental and behavioral disorders (12/01/2017), Personal history of other specified conditions (05/15/2014), Personal history of other specified conditions (12/01/2017), and Personal history of other specified conditions.    Surgical History:  He has a past surgical history that includes Other surgical history (01/04/2019); Hernia repair (04/18/2014); Other surgical history (04/18/2014); Anterior cruciate ligament repair (04/18/2014); CT angio head w and wo IV contrast (3/4/2023); and CT angio neck (3/4/2023).     Family History:  Family History   Problem Relation Name Age of Onset    Alcohol abuse Mother      Dementia Mother      Other (unknown cause of morbidity or mortality) Father      Breast cancer Sister      Hypertension Other       Breast cancer Other      Cancer Other       Social History:  He reports that he quit smoking about 31 years ago. His smoking use included cigarettes. He has never used smokeless tobacco. He reports that he does not currently use alcohol. He reports that he does not use drugs.     Allergies:  Pollen extracts    Current Meds:  Current Outpatient Medications:     albuterol 2.5 mg /3 mL (0.083 %) nebulizer solution, Take 3 mL (2.5 mg) by nebulization 4 times a day as needed for wheezing or shortness of breath., Disp: 90 mL, Rfl: 3    albuterol 90 mcg/actuation inhaler, Inhale 2 puffs every 4 hours if needed for wheezing., Disp: 25.5 g, Rfl: 0    amLODIPine (Norvasc) 10 mg tablet, Take 1 tablet by mouth once daily, Disp: 90 tablet, Rfl: 3    ascorbic acid (Vitamin C) 250 mg tablet, Vitamin C TABS  Refills: 0     Active, Disp: , Rfl:     atorvastatin (Lipitor) 10 mg tablet, Take 1 tablet (10 mg) by mouth once daily at bedtime., Disp: 90 tablet, Rfl: 3    azelastine (Astelin) 137 mcg (0.1 %) nasal spray, Administer 2 sprays into each nostril 2 times a day. Use in each nostril as directed, Disp: 90 mL, Rfl: 3    cetirizine (ZyrTEC) 10 mg tablet, Take 1 tablet (10 mg) by mouth 2 times a day., Disp: , Rfl:     cholecalciferol (Vitamin D-3) 5,000 Units tablet, Take 1 tablet (5,000 Units) by mouth once daily., Disp: , Rfl:     cyanocobalamin, vitamin B-12, (VITAMIN B-12 ORAL), Take 1,000 mg by mouth once daily., Disp: , Rfl:     fluticasone (Flonase) 50 mcg/actuation nasal spray, Administer 1 spray into each nostril 2 times a day. Shake gently. Before first use, prime pump. After use, clean tip and replace cap., Disp: 48 g, Rfl: 3    folic acid (Folvite) 800 mcg tablet, 1 tablet (800 mcg) once daily., Disp: , Rfl:     ipratropium (Atrovent) 21 mcg (0.03 %) nasal spray, Ipratropium Bromide 0.03 % Nasal Solution  Quantity: 60  Refills: 0      Start : 15-Jose-2022  Active, Disp: , Rfl:     labetalol (Normodyne) 100 mg tablet,  TAKE 2 TABLETS EVERY MORNING . (Patient taking differently: Take 1 tablet (100 mg) by mouth 2 times a day. TAKE 2 TABLETS EVERY MORNING .), Disp: 270 tablet, Rfl: 3    montelukast (Singulair) 10 mg tablet, Take 1 tablet (10 mg) by mouth once daily at bedtime., Disp: , Rfl:     Mucus Relief  mg 12 hr tablet, Take 1 tablet (600 mg) by mouth 2 times a day. As needed, Disp: , Rfl:     nebulizer and compressor device, Use the device  for breathing treatments, Disp: 1 each, Rfl: 0    tamsulosin (Flomax) 0.4 mg 24 hr capsule, Take 1 capsule (0.4 mg) by mouth once daily at bedtime., Disp: 90 capsule, Rfl: 3    Vitals:  Visit Vitals  Smoking Status Former     Physical Exam:  Nose: On external exam there are neither lesions nor asymmetry of the nasal tip/dorsum. On anterior rhinoscopy, visualization posteriorly is limited on anterior examination. For this reason, to adequately evaluate posteriorly for masses, source of epistaxis, polypoid disease, debridement, and/or signs of infections, nasal endoscopy is indicated.  (Please see procedure below.)    SINONASAL ENDOSCOPY (CPT 21566): To better evaluate the patient's symptoms, sinonasal endoscopy is indicated.  After discussion of risks and benefits, and topical decongestion and anesthesia, an endoscope was used to perform nasal endoscopy on each side.  A time out identifying the patient, the procedure, the location of the procedure and any concerns was performed prior to beginning the procedure.    Findings:  Examination of the right nasal cavity revealed inferior turbinate hypertrophy.  There was no evidence of purulence or polyposis at the middle meatus or sphenoethmoid recess.  Examination of the left nasal cavity revealed inferior turbinate hypertrophy.  There was no evidence of purulence or polyposis at the middle meatus or sphenoethmoid recess.    Provider Impressions:  1.  Nasal airway obstruction, inferior turbinate hypertrophy, history of previous nasal airway  surgery with Dr. Varghese 2021; benefit with stenting  2.  Postnasal drainage  3.  Throat clearing, cough  4.  Epistaxis  5.  Allergic rhinitis    Discussion:  Rambo Wong and I discussed his exam and symptoms.  He is not specifically bothered by his current symptoms so I think continued observation would make sense.  We discussed the difference between fluticasone and azelastine and that fluticasone tends to work better when used consistently but Azelastine can work well when used as needed.  He could also consider using saline spray consistently or intermittently.  I recommended follow-up with me in 12 months or sooner if his symptoms worsen.  All questions were answered.    Scribe Attestation  By signing my name below, I, Kylie Parikh, attest that this documentation has been prepared under the direction and in the presence of David Sawyer MD.    Signature:  David Sawyer MD

## 2024-09-10 ENCOUNTER — APPOINTMENT (OUTPATIENT)
Dept: OTOLARYNGOLOGY | Facility: CLINIC | Age: 84
End: 2024-09-10
Payer: MEDICARE

## 2024-09-10 DIAGNOSIS — J34.3 HYPERTROPHY OF BOTH INFERIOR NASAL TURBINATES: ICD-10-CM

## 2024-09-10 DIAGNOSIS — R09.82 POST-NASAL DRAINAGE: ICD-10-CM

## 2024-09-10 DIAGNOSIS — J34.89 NASAL OBSTRUCTION: Primary | ICD-10-CM

## 2024-09-10 PROCEDURE — 1159F MED LIST DOCD IN RCRD: CPT | Performed by: OTOLARYNGOLOGY

## 2024-09-10 PROCEDURE — 99213 OFFICE O/P EST LOW 20 MIN: CPT | Performed by: OTOLARYNGOLOGY

## 2024-09-10 PROCEDURE — 1160F RVW MEDS BY RX/DR IN RCRD: CPT | Performed by: OTOLARYNGOLOGY

## 2024-09-10 PROCEDURE — 31231 NASAL ENDOSCOPY DX: CPT | Performed by: OTOLARYNGOLOGY

## 2024-09-10 PROCEDURE — 1036F TOBACCO NON-USER: CPT | Performed by: OTOLARYNGOLOGY

## 2024-09-27 ENCOUNTER — OFFICE VISIT (OUTPATIENT)
Dept: CARDIOLOGY | Facility: HOSPITAL | Age: 84
End: 2024-09-27
Payer: MEDICARE

## 2024-09-27 VITALS
HEART RATE: 67 BPM | OXYGEN SATURATION: 97 % | DIASTOLIC BLOOD PRESSURE: 73 MMHG | BODY MASS INDEX: 32.74 KG/M2 | SYSTOLIC BLOOD PRESSURE: 121 MMHG | WEIGHT: 255 LBS

## 2024-09-27 DIAGNOSIS — R42 DIZZINESSES: ICD-10-CM

## 2024-09-27 PROBLEM — F10.20 ALCOHOL DEPENDENCE: Status: ACTIVE | Noted: 2024-09-27

## 2024-09-27 PROBLEM — F32.A DEPRESSIVE DISORDER: Status: ACTIVE | Noted: 2024-09-27

## 2024-09-27 LAB
ATRIAL RATE: 67 BPM
P AXIS: 40 DEGREES
P OFFSET: 180 MS
P ONSET: 112 MS
PR INTERVAL: 128 MS
Q ONSET: 223 MS
QRS COUNT: 11 BEATS
QRS DURATION: 102 MS
QT INTERVAL: 418 MS
QTC CALCULATION(BAZETT): 441 MS
QTC FREDERICIA: 434 MS
R AXIS: 49 DEGREES
T AXIS: 30 DEGREES
T OFFSET: 432 MS
VENTRICULAR RATE: 67 BPM

## 2024-09-27 PROCEDURE — 3074F SYST BP LT 130 MM HG: CPT | Performed by: INTERNAL MEDICINE

## 2024-09-27 PROCEDURE — 3078F DIAST BP <80 MM HG: CPT | Performed by: INTERNAL MEDICINE

## 2024-09-27 PROCEDURE — 99204 OFFICE O/P NEW MOD 45 MIN: CPT | Performed by: INTERNAL MEDICINE

## 2024-09-27 PROCEDURE — 1159F MED LIST DOCD IN RCRD: CPT | Performed by: INTERNAL MEDICINE

## 2024-09-27 PROCEDURE — 93005 ELECTROCARDIOGRAM TRACING: CPT | Performed by: INTERNAL MEDICINE

## 2024-09-27 PROCEDURE — 99214 OFFICE O/P EST MOD 30 MIN: CPT | Performed by: INTERNAL MEDICINE

## 2024-09-27 ASSESSMENT — COLUMBIA-SUICIDE SEVERITY RATING SCALE - C-SSRS
6. HAVE YOU EVER DONE ANYTHING, STARTED TO DO ANYTHING, OR PREPARED TO DO ANYTHING TO END YOUR LIFE?: NO
1. IN THE PAST MONTH, HAVE YOU WISHED YOU WERE DEAD OR WISHED YOU COULD GO TO SLEEP AND NOT WAKE UP?: NO
2. HAVE YOU ACTUALLY HAD ANY THOUGHTS OF KILLING YOURSELF?: NO

## 2024-09-27 NOTE — PROGRESS NOTES
I had the pleasure seeing Rambo MAXIMILIAN Marvin     Chief Complaint   Patient presents with    Hypertension    Dizziness    Palpitations    1st degree AVB     OUTPATIENT CONSULTATION: Cardiac Electrophysiology  DOS: September 27, 2024  REASON:  Dizziness and 1st degree AVB  REFERRING:  Sahra De Leon MD       She was evaluated by Kris Monique DO in September 25, 2020 for palpitations.       Current Outpatient Medications   Medication Instructions    albuterol 90 mcg/actuation inhaler 2 puffs, inhalation, Every 4 hours PRN    albuterol 2.5 mg, nebulization, 4 times daily PRN    amLODIPine (NORVASC) 10 mg, oral, Daily    ascorbic acid (Vitamin C) 250 mg tablet Vitamin C TABS   Refills: 0       Active    atorvastatin (LIPITOR) 10 mg, oral, Nightly    azelastine (Astelin) 137 mcg (0.1 %) nasal spray 2 sprays, Each Nostril, 2 times daily, Use in each nostril as directed    cetirizine (ZyrTEC) 10 mg tablet Take 1 tablet (10 mg) by mouth 2 times a day.    cholecalciferol (Vitamin D-3) 5,000 Units tablet 1 tablet, oral, Daily    cyanocobalamin, vitamin B-12, (VITAMIN B-12 ORAL) Take 1,000 mg by mouth once daily.    fluticasone (Flonase) 50 mcg/actuation nasal spray 1 spray, Each Nostril, 2 times daily, Shake gently. Before first use, prime pump. After use, clean tip and replace cap.    folic acid (Folvite) 800 mcg tablet 1 tablet (800 mcg) once daily.    ipratropium (Atrovent) 21 mcg (0.03 %) nasal spray Ipratropium Bromide 0.03 % Nasal Solution   Quantity: 60  Refills: 0        Start : 15-Jose-2022   Active    labetalol (Normodyne) 100 mg tablet TAKE 2 TABLETS EVERY MORNING .    montelukast (Singulair) 10 mg tablet Take 1 tablet (10 mg) by mouth once daily at bedtime.    Mucus Relief  mg, oral, 2 times daily, As needed    nebulizer and compressor device Use the device  for breathing treatments    tamsulosin (FLOMAX) 0.4 mg, oral, Nightly      Rambo Wong is a 84 y.o. with:     HTN, Anxiety, CKD stage G3b/  A1  Dementia, KHAI, hx of ETOH  abuse  Dizziness  Palpitations   1st degree AVB noted on ECG with non specific ST T changes on August 27th, 2024.     84-year-old male with history of dyslipidemia, cognitive impairment, EtOH abuse sober since 2017. He is here to assess the ECG from August 27th, 2024. He has no symptoms.   CARDIAC TESTING:    -ECHO/ TTE:  (11/29/23) LVEF 55-60%.  LV impaired relaxation pattern diastolic filling    -Stress Echo: (2017)  6.4 METs, only reached 69% max predicted heart rate, normal LVEF at rest and with stress.       Objective   Physical Exam  Constitutional: alert and in no acute distress.   Eyes: no erythema, swelling or discharge from the eye .   Neck: neck is supple, symmetric, trachea midline, no masses .   Pulmonary: no increased work of breathing or signs of respiratory distress  and lungs clear to auscultation.    Cardiovascular:  regular rhythm, normal S1 and S2, no murmurs , pedal pulses 2+ bilaterally  and no edema .   Abdomen: abdomen non-tender, no masses .   Skin: skin warm and dry, normal skin turgor .   Psychiatric judgment and insight is normal  and oriented to person, place and time .             Assessment/Plan   ECG shows first degree AV block. He has some non specific ST T segment changes .  He is completely asymptomatic. Discussed exercise. Discussed the significance of first degree AV block. As he is asymptomatic no need for further testing and treatment.

## 2024-10-17 ENCOUNTER — APPOINTMENT (OUTPATIENT)
Dept: PRIMARY CARE | Facility: CLINIC | Age: 84
End: 2024-10-17
Payer: MEDICARE

## 2024-12-04 ENCOUNTER — APPOINTMENT (OUTPATIENT)
Dept: PRIMARY CARE | Facility: CLINIC | Age: 84
End: 2024-12-04
Payer: MEDICARE

## 2024-12-04 VITALS
BODY MASS INDEX: 32.44 KG/M2 | WEIGHT: 252.8 LBS | HEART RATE: 68 BPM | SYSTOLIC BLOOD PRESSURE: 113 MMHG | DIASTOLIC BLOOD PRESSURE: 68 MMHG | OXYGEN SATURATION: 93 % | HEIGHT: 74 IN

## 2024-12-04 DIAGNOSIS — R26.89 DIFFICULTY BALANCING WHEN STANDING: Primary | ICD-10-CM

## 2024-12-04 PROCEDURE — 1159F MED LIST DOCD IN RCRD: CPT | Performed by: STUDENT IN AN ORGANIZED HEALTH CARE EDUCATION/TRAINING PROGRAM

## 2024-12-04 PROCEDURE — G2211 COMPLEX E/M VISIT ADD ON: HCPCS | Performed by: STUDENT IN AN ORGANIZED HEALTH CARE EDUCATION/TRAINING PROGRAM

## 2024-12-04 PROCEDURE — 3074F SYST BP LT 130 MM HG: CPT | Performed by: STUDENT IN AN ORGANIZED HEALTH CARE EDUCATION/TRAINING PROGRAM

## 2024-12-04 PROCEDURE — 3078F DIAST BP <80 MM HG: CPT | Performed by: STUDENT IN AN ORGANIZED HEALTH CARE EDUCATION/TRAINING PROGRAM

## 2024-12-04 PROCEDURE — 1160F RVW MEDS BY RX/DR IN RCRD: CPT | Performed by: STUDENT IN AN ORGANIZED HEALTH CARE EDUCATION/TRAINING PROGRAM

## 2024-12-04 PROCEDURE — 99214 OFFICE O/P EST MOD 30 MIN: CPT | Performed by: STUDENT IN AN ORGANIZED HEALTH CARE EDUCATION/TRAINING PROGRAM

## 2024-12-04 PROCEDURE — 1036F TOBACCO NON-USER: CPT | Performed by: STUDENT IN AN ORGANIZED HEALTH CARE EDUCATION/TRAINING PROGRAM

## 2024-12-04 NOTE — PROGRESS NOTES
"Subjective   Patient ID: Rambo Wong is a 84 y.o. male who presents for Follow-up (4 month follow-up. ).        HPI  85 y/o male with h/o alcohol abuse (sober since the last 3 years as of june 2020 ) , HTN, HPL, alcohol induced dementia , former smoker with 40 pk year history         No physical activity ,not even within the home   Relies on wife to fetch him things       Visit Vitals  /68   Pulse 68   Ht 1.88 m (6' 2\")   Wt 115 kg (252 lb 12.8 oz)   SpO2 93%   BMI 32.46 kg/m²   Smoking Status Former   BSA 2.45 m²      No LMP for male patient.   Current Outpatient Medications   Medication Instructions    albuterol 90 mcg/actuation inhaler 2 puffs, inhalation, Every 4 hours PRN    albuterol 2.5 mg, nebulization, 4 times daily PRN    amLODIPine (NORVASC) 10 mg, oral, Daily    ascorbic acid (Vitamin C) 250 mg tablet Vitamin C TABS   Refills: 0       Active    atorvastatin (LIPITOR) 10 mg, oral, Nightly    azelastine (Astelin) 137 mcg (0.1 %) nasal spray 2 sprays, Each Nostril, 2 times daily, Use in each nostril as directed    cetirizine (ZyrTEC) 10 mg tablet Take 1 tablet (10 mg) by mouth 2 times a day.    cholecalciferol (Vitamin D-3) 5,000 Units tablet 1 tablet, Daily    cyanocobalamin, vitamin B-12, (VITAMIN B-12 ORAL) Take 1,000 mg by mouth once daily.    fluticasone (Flonase) 50 mcg/actuation nasal spray 1 spray, Each Nostril, 2 times daily, Shake gently. Before first use, prime pump. After use, clean tip and replace cap.    folic acid (Folvite) 800 mcg tablet 1 tablet (800 mcg) once daily.    ipratropium (Atrovent) 21 mcg (0.03 %) nasal spray Ipratropium Bromide 0.03 % Nasal Solution   Quantity: 60  Refills: 0        Start : 15-Jose-2022   Active    labetalol (Normodyne) 100 mg tablet TAKE 2 TABLETS EVERY MORNING .    Mucus Relief  mg, 2 times daily    nebulizer and compressor device Use the device  for breathing treatments    tamsulosin (FLOMAX) 0.4 mg, oral, Nightly      Social History     Tobacco " Use    Smoking status: Former     Current packs/day: 0.00     Types: Cigarettes     Quit date:      Years since quittin.9    Smokeless tobacco: Never   Substance Use Topics    Alcohol use: Not Currently        Review of Systems    Constitutional : No feeling poorly / fevers/ chills / night sweats/ fatigue   Cardiovascular : No CP /Palpitations/ lower extremity edema / syncope   Respiratory : No Cough /BEAULIEU/Dyspnea at rest   Gastrointestinal : No abd pain / N/V  No bloody stools/ melena / constipation  Endo : No polyuria/polydipsia/ muscle weakness / sluggishness   CNS: No confusion / HA/ tingling/ numbness/ weakness of extremities  Psychiatric: No anxiety/ depression/ SI/HI    All other systems have been reviewed and are negative for complaint       Physical Exam    Constitutional : Vitals reviewed. Alert and in no distress  Cardiovascular : RRR, Normal S1, S2, No pericardial rub/ gallop, no peripheral edema   Pulmonary: No respiratory distress, CTAB   MSK : Normal gait and station , strength and tone   .  Neurologic : CNs 2-12 grossly intact , no obvious FNDs  Psych : A,Ox3, normal mood and affect      Assessment/Plan   Diagnoses and all orders for this visit:  Difficulty balancing when standing  -     Referral to Physical Therapy; Future          85 y/o male with h/o alcohol abuse (sober since the last 3 years as of 2020 ) , HTN, HPL, alcohol induced dementia , former smoker with 40 pk year history      Physical deconditioning due to lack of ambulation over the years   PT refe  Start with 5mins of everyday activity , walking in the hallways  and increase by 5mins each week   RTO in August for [facility]W or sooner if needed      Conditions addressed and mgmt as noted above.  Pertinent labs, images/ imaging reports , chart review was done .   Age appropriate labs / labs for mgmt of chronic medical conditions ordered, further mgmt pending the results.       This note is intended for the physician writing it, as  well as to communicate findings to other healthcare professionals. These notes use medical lexicon that may be misunderstood by non medical persons. Therefore, interpretations of medical notes and terminology should be approached with caution.

## 2025-01-12 DIAGNOSIS — I10 BENIGN ESSENTIAL HYPERTENSION: ICD-10-CM

## 2025-01-17 RX ORDER — LABETALOL 100 MG/1
TABLET, FILM COATED ORAL
Qty: 180 TABLET | Refills: 3 | Status: SHIPPED | OUTPATIENT
Start: 2025-01-17

## 2025-06-12 DIAGNOSIS — E78.5 HYPERLIPIDEMIA, UNSPECIFIED HYPERLIPIDEMIA TYPE: ICD-10-CM

## 2025-06-13 RX ORDER — ATORVASTATIN CALCIUM 10 MG/1
10 TABLET, FILM COATED ORAL NIGHTLY
Qty: 90 TABLET | Refills: 0 | Status: SHIPPED | OUTPATIENT
Start: 2025-06-13

## 2025-06-20 ENCOUNTER — APPOINTMENT (OUTPATIENT)
Dept: UROLOGY | Facility: CLINIC | Age: 85
End: 2025-06-20
Payer: MEDICARE

## 2025-06-20 VITALS — TEMPERATURE: 97 F

## 2025-06-20 DIAGNOSIS — R97.20 ELEVATED PSA: ICD-10-CM

## 2025-06-20 DIAGNOSIS — N40.1 BENIGN PROSTATIC HYPERPLASIA WITH NOCTURIA: Primary | ICD-10-CM

## 2025-06-20 DIAGNOSIS — N40.0 ENLARGED PROSTATE WITHOUT LOWER URINARY TRACT SYMPTOMS (LUTS): ICD-10-CM

## 2025-06-20 DIAGNOSIS — R35.1 BENIGN PROSTATIC HYPERPLASIA WITH NOCTURIA: Primary | ICD-10-CM

## 2025-06-20 PROCEDURE — 1159F MED LIST DOCD IN RCRD: CPT | Performed by: NURSE PRACTITIONER

## 2025-06-20 PROCEDURE — 1160F RVW MEDS BY RX/DR IN RCRD: CPT | Performed by: NURSE PRACTITIONER

## 2025-06-20 PROCEDURE — 99213 OFFICE O/P EST LOW 20 MIN: CPT | Performed by: NURSE PRACTITIONER

## 2025-06-20 PROCEDURE — G2211 COMPLEX E/M VISIT ADD ON: HCPCS | Performed by: NURSE PRACTITIONER

## 2025-06-20 PROCEDURE — 1126F AMNT PAIN NOTED NONE PRSNT: CPT | Performed by: NURSE PRACTITIONER

## 2025-06-20 RX ORDER — TAMSULOSIN HYDROCHLORIDE 0.4 MG/1
0.4 CAPSULE ORAL NIGHTLY
Qty: 90 CAPSULE | Refills: 3 | Status: SHIPPED | OUTPATIENT
Start: 2025-06-20

## 2025-06-20 ASSESSMENT — PAIN SCALES - GENERAL: PAINLEVEL_OUTOF10: 0-NO PAIN

## 2025-06-20 NOTE — PROGRESS NOTES
[facility]  Outpatient Clinic Note    Subjective   Rambo Wong is a 85 y.o. male    History of Present Illness   Patient presenting to clinic today for annual FUV. History of BPH with LUTS taking Tamsulosin 0.4 mg daily, Elevated PSA, urinary retention, and Hydrocele. Not bothered with DTF, NTF 1 to 2x.   Denies gross hematuria, dysuria, frequency, urgency, leaking, urinary tract infections, fevers, or chills. No ED concerns    PVR 53 ml     Lab Results   Component Value Date    PSA 6.89 (H) 08/26/2024    PSA 3.56 09/27/2023     Past Medical History and Surgical History   Past Medical History:   Diagnosis Date    Allergy status to unspecified drugs, medicaments and biological substances     History of allergy    Essential (primary) hypertension 06/22/2020    Benign essential hypertension    Left lower quadrant pain     Abdominal pain, LLQ (left lower quadrant)    Other amnesia     Memory loss    Other conditions influencing health status 08/20/2013    Alzheimer Disease Early Onset Uncomplicated    Other conditions influencing health status     Headache In The Forehead (Frontal)    Personal history of other diseases of the circulatory system     History of hypertension    Personal history of other endocrine, nutritional and metabolic disease     History of hypercholesterolemia    Personal history of other mental and behavioral disorders 12/01/2017    History of depression    Personal history of other specified conditions 05/15/2014    History of dizziness    Personal history of other specified conditions 12/01/2017    History of chest pain    Personal history of other specified conditions     History of headache     Past Surgical History:   Procedure Laterality Date    ANTERIOR CRUCIATE LIGAMENT REPAIR  04/18/2014    Primary Repair Of Knee Ligament Cruciate Anterior    CT ANGIO NECK  3/4/2023    CT NECK ANGIO W AND WO IV CONTRAST [facility]    CT HEAD ANGIO W AND WO IV CONTRAST  3/4/2023    CT HEAD ANGIO W  AND WO IV CONTRAST [facility]    HERNIA REPAIR  04/18/2014    Inguinal Hernia Repair    OTHER SURGICAL HISTORY  01/04/2019    Cataract surgery    OTHER SURGICAL HISTORY  04/18/2014    Closed Treatment Of Bimalleolar Ankle Fracture       Medications  Current Outpatient Medications on File Prior to Visit   Medication Sig Dispense Refill    albuterol 2.5 mg /3 mL (0.083 %) nebulizer solution Take 3 mL (2.5 mg) by nebulization 4 times a day as needed for wheezing or shortness of breath. 90 mL 3    albuterol 90 mcg/actuation inhaler Inhale 2 puffs every 4 hours if needed for wheezing. 25.5 g 0    amLODIPine (Norvasc) 10 mg tablet Take 1 tablet by mouth once daily 90 tablet 3    ascorbic acid (Vitamin C) 250 mg tablet Vitamin C TABS   Refills: 0       Active      atorvastatin (Lipitor) 10 mg tablet Take 1 tablet (10 mg) by mouth once daily at bedtime. 90 tablet 0    azelastine (Astelin) 137 mcg (0.1 %) nasal spray Administer 2 sprays into each nostril 2 times a day. Use in each nostril as directed 90 mL 3    cetirizine (ZyrTEC) 10 mg tablet Take 1 tablet (10 mg) by mouth 2 times a day.      cholecalciferol (Vitamin D-3) 5,000 Units tablet Take 1 tablet (5,000 Units) by mouth once daily.      cyanocobalamin, vitamin B-12, (VITAMIN B-12 ORAL) Take 1,000 mg by mouth once daily.      fluticasone (Flonase) 50 mcg/actuation nasal spray Administer 1 spray into each nostril 2 times a day. Shake gently. Before first use, prime pump. After use, clean tip and replace cap. 48 g 3    folic acid (Folvite) 800 mcg tablet 1 tablet (800 mcg) once daily.      ipratropium (Atrovent) 21 mcg (0.03 %) nasal spray Ipratropium Bromide 0.03 % Nasal Solution   Quantity: 60  Refills: 0        Start : 15-Jose-2022   Active      labetalol (Normodyne) 100 mg tablet TAKE 2 TABLETS EVERY MORNING . 180 tablet 3    Mucus Relief  mg 12 hr tablet Take 1 tablet (600 mg) by mouth 2 times a day. As needed      nebulizer and compressor device Use the device   for breathing treatments 1 each 0    tamsulosin (Flomax) 0.4 mg 24 hr capsule Take 1 capsule (0.4 mg) by mouth once daily at bedtime. 90 capsule 3    [DISCONTINUED] atorvastatin (Lipitor) 10 mg tablet Take 1 tablet (10 mg) by mouth once daily at bedtime. 90 tablet 3     No current facility-administered medications on file prior to visit.       Objective   Physicial Exam  General: Well developed, well nourished, alert and cooperative, appears in no acute distress  Eyes: Non-injected conjunctiva, sclera clear, no proptosis  Cardiac: Extremities are warm and well perfused. No edema, cyanosis or pallor.   Lungs: Breathing is easy, non-labored. Speaking in clear and complete sentences. Normal diaphragmatic movement.  MSK: Ambulatory with steady gait, unassisted  Neuro: alert and oriented to person, place and time  Psych: Demonstrates good judgement and reason, without hallucinations, abnormal affect or abnormal behaviors.  Skin: no obvious lesions, no rashes.    Review of Systems  All other systems have been reviewed and are negative for complaint.      Assessment and Plan     1) Elevated PSA   Today we discussed PSA as a tool to screen gentleman for prostate cancer. We discussed that many factors can elevate the PSA, and when the PSA is elevated further testing is warranted.    We discussed the patient's PSA as well as the controversy of using PSA for screening, especially past the age of 70 years old. I explained that while PSA is used for  prostate cancer screening, it is not specific to prostate cancer and can be elevated with benign growth of the prostate. We discussed options of proceeding with repeating MRI, proceeding with transrectal ultrasound prostate biopsy, or continuing to monitor his PSA.   No further testing indicated     2) BPH with LUTS  Continue Tamsulosin 0.4 mg daily. Refilled today   RTC yearly, sooner if needed     All questions and concerns were addressed. Patient verbalizes understanding and has  no other questions at this time.     Renita Luna-- POLO KELLEY  Office Phone:  [phone]

## 2025-07-15 ENCOUNTER — TELEPHONE (OUTPATIENT)
Dept: PRIMARY CARE | Facility: CLINIC | Age: 85
End: 2025-07-15
Payer: MEDICARE

## 2025-07-15 DIAGNOSIS — M25.70 CALLUS OF BONE: ICD-10-CM

## 2025-07-15 DIAGNOSIS — R23.9 SKIN COMPLAINTS: ICD-10-CM

## 2025-07-22 DIAGNOSIS — I10 PRIMARY HYPERTENSION: ICD-10-CM

## 2025-07-22 RX ORDER — AMLODIPINE BESYLATE 10 MG/1
10 TABLET ORAL DAILY
Qty: 90 TABLET | Refills: 0 | Status: SHIPPED | OUTPATIENT
Start: 2025-07-22

## 2025-08-05 ENCOUNTER — HOSPITAL ENCOUNTER (OUTPATIENT)
Dept: RADIOLOGY | Facility: CLINIC | Age: 85
Discharge: HOME | End: 2025-08-05
Payer: MEDICARE

## 2025-08-05 ENCOUNTER — APPOINTMENT (OUTPATIENT)
Dept: PRIMARY CARE | Facility: CLINIC | Age: 85
End: 2025-08-05
Payer: MEDICARE

## 2025-08-05 VITALS
OXYGEN SATURATION: 94 % | BODY MASS INDEX: 31.44 KG/M2 | HEIGHT: 74 IN | HEART RATE: 83 BPM | SYSTOLIC BLOOD PRESSURE: 129 MMHG | DIASTOLIC BLOOD PRESSURE: 73 MMHG | WEIGHT: 245 LBS

## 2025-08-05 DIAGNOSIS — E78.2 MIXED HYPERLIPIDEMIA: ICD-10-CM

## 2025-08-05 DIAGNOSIS — B35.4 TINEA CORPORIS: ICD-10-CM

## 2025-08-05 DIAGNOSIS — M25.551 BILATERAL HIP PAIN: ICD-10-CM

## 2025-08-05 DIAGNOSIS — E78.5 HYPERLIPIDEMIA, UNSPECIFIED HYPERLIPIDEMIA TYPE: ICD-10-CM

## 2025-08-05 DIAGNOSIS — M25.552 BILATERAL HIP PAIN: ICD-10-CM

## 2025-08-05 DIAGNOSIS — I10 PRIMARY HYPERTENSION: ICD-10-CM

## 2025-08-05 DIAGNOSIS — J41.1 MUCOPURULENT CHRONIC BRONCHITIS (MULTI): ICD-10-CM

## 2025-08-05 DIAGNOSIS — R73.03 PREDIABETES: ICD-10-CM

## 2025-08-05 DIAGNOSIS — N18.32 STAGE 3B CHRONIC KIDNEY DISEASE (MULTI): ICD-10-CM

## 2025-08-05 DIAGNOSIS — I10 BENIGN ESSENTIAL HYPERTENSION: ICD-10-CM

## 2025-08-05 DIAGNOSIS — Z00.00 MEDICARE ANNUAL WELLNESS VISIT, SUBSEQUENT: Primary | ICD-10-CM

## 2025-08-05 DIAGNOSIS — G30.9 ALZHEIMER'S DISEASE, UNSPECIFIED (CODE): ICD-10-CM

## 2025-08-05 DIAGNOSIS — F10.27 DEMENTIA ASSOCIATED WITH ALCOHOLISM WITHOUT BEHAVIORAL DISTURBANCE: ICD-10-CM

## 2025-08-05 PROBLEM — F03.90 DEMENTIA: Status: RESOLVED | Noted: 2023-03-09 | Resolved: 2025-08-05

## 2025-08-05 PROCEDURE — 73522 X-RAY EXAM HIPS BI 3-4 VIEWS: CPT

## 2025-08-05 PROCEDURE — 1170F FXNL STATUS ASSESSED: CPT | Performed by: STUDENT IN AN ORGANIZED HEALTH CARE EDUCATION/TRAINING PROGRAM

## 2025-08-05 PROCEDURE — 99214 OFFICE O/P EST MOD 30 MIN: CPT | Performed by: STUDENT IN AN ORGANIZED HEALTH CARE EDUCATION/TRAINING PROGRAM

## 2025-08-05 PROCEDURE — 3078F DIAST BP <80 MM HG: CPT | Performed by: STUDENT IN AN ORGANIZED HEALTH CARE EDUCATION/TRAINING PROGRAM

## 2025-08-05 PROCEDURE — 73522 X-RAY EXAM HIPS BI 3-4 VIEWS: CPT | Mod: BILATERAL PROCEDURE | Performed by: RADIOLOGY

## 2025-08-05 PROCEDURE — 1160F RVW MEDS BY RX/DR IN RCRD: CPT | Performed by: STUDENT IN AN ORGANIZED HEALTH CARE EDUCATION/TRAINING PROGRAM

## 2025-08-05 PROCEDURE — 3074F SYST BP LT 130 MM HG: CPT | Performed by: STUDENT IN AN ORGANIZED HEALTH CARE EDUCATION/TRAINING PROGRAM

## 2025-08-05 PROCEDURE — G0439 PPPS, SUBSEQ VISIT: HCPCS | Performed by: STUDENT IN AN ORGANIZED HEALTH CARE EDUCATION/TRAINING PROGRAM

## 2025-08-05 PROCEDURE — 1159F MED LIST DOCD IN RCRD: CPT | Performed by: STUDENT IN AN ORGANIZED HEALTH CARE EDUCATION/TRAINING PROGRAM

## 2025-08-05 RX ORDER — ATORVASTATIN CALCIUM 10 MG/1
10 TABLET, FILM COATED ORAL NIGHTLY
Qty: 90 TABLET | Refills: 2 | Status: SHIPPED | OUTPATIENT
Start: 2025-08-05

## 2025-08-05 RX ORDER — CLOTRIMAZOLE AND BETAMETHASONE DIPROPIONATE 10; .64 MG/G; MG/G
1 CREAM TOPICAL 2 TIMES DAILY
Qty: 15 G | Refills: 3 | Status: SHIPPED | OUTPATIENT
Start: 2025-08-05 | End: 2025-10-04

## 2025-08-05 RX ORDER — AMLODIPINE BESYLATE 10 MG/1
10 TABLET ORAL DAILY
Qty: 90 TABLET | Refills: 1 | Status: SHIPPED | OUTPATIENT
Start: 2025-08-05

## 2025-08-05 RX ORDER — ALBUTEROL SULFATE 0.83 MG/ML
2.5 SOLUTION RESPIRATORY (INHALATION) 4 TIMES DAILY PRN
Qty: 90 ML | Refills: 1 | Status: SHIPPED | OUTPATIENT
Start: 2025-08-05 | End: 2026-08-05

## 2025-08-05 ASSESSMENT — ACTIVITIES OF DAILY LIVING (ADL)
GROCERY_SHOPPING: INDEPENDENT
MANAGING_FINANCES: INDEPENDENT
DRESSING: INDEPENDENT
BATHING: INDEPENDENT
DOING_HOUSEWORK: INDEPENDENT
TAKING_MEDICATION: INDEPENDENT

## 2025-08-05 NOTE — PROGRESS NOTES
Subjective   Reason for Visit: Rambo Wong is an 85 y.o. male here for a Medicare Wellness visit.     Past Medical, Surgical, and Family History reviewed and updated in chart.    Reviewed all medications by prescribing practitioner or clinical pharmacist (such as prescriptions, OTCs, herbal therapies and supplements) and documented in the medical record.    HPI  84 y/o male with h/o alcohol abuse (sober since the last 3 years as of june 2020 ) , HTN, HPL, alcohol induced dementia , former smoker with 40 pk year history           Patient Care Team:  Sahra De Leon MD as PCP - General (Family Medicine)  Sah[place] De [place] [facility] [place]     Current Outpatient Medications   Medication Instructions    albuterol 90 mcg/actuation inhaler 2 puffs, inhalation, Every 4 hours PRN    albuterol 2.5 mg, nebulization, 4 times daily PRN    amLODIPine (NORVASC) 10 mg, oral, Daily    ascorbic acid (Vitamin C) 250 mg tablet Vitamin C TABS   Refills: 0       Active    atorvastatin (LIPITOR) 10 mg, oral, Nightly    azelastine (Astelin) 137 mcg (0.1 %) nasal spray 2 sprays, Each Nostril, 2 times daily, Use in each nostril as directed    cetirizine (ZyrTEC) 10 mg tablet Take 1 tablet (10 mg) by mouth 2 times a day.    cholecalciferol (Vitamin D-3) 5,000 Units tablet 1 tablet, Daily    clotrimazole-betamethasone (Lotrisone) cream 1 Application, Topical, 2 times daily    cyanocobalamin, vitamin B-12, (VITAMIN B-12 ORAL) Take 1,000 mg by mouth once daily.    fluticasone (Flonase) 50 mcg/actuation nasal spray 1 spray, Each Nostril, 2 times daily, Shake gently. Before first use, prime pump. After use, clean tip and replace cap.    folic acid (Folvite) 800 mcg tablet 1 tablet (0.8 mg) once daily.    labetalol (Normodyne) 100 mg tablet TAKE 2 TABLETS EVERY MORNING .    Mucus Relief  mg, 2 times daily    nebulizer and compressor device Use the device  for breathing treatments    tamsulosin (FLOMAX)  "0.4 mg, oral, Nightly        Social History     Tobacco Use    Smoking status: Former     Current packs/day: 0.00     Types: Cigarettes     Quit date:      Years since quittin.6    Smokeless tobacco: Never   Substance Use Topics    Alcohol use: Not Currently        Review of Systems  Constitutional: no chills, no fever and no night sweats.     Eyes: no blurred vision and no eyesight problems.     ENT: no hearing loss, no nasal congestion, no nasal discharge, no hoarseness and no sore throat.     Cardiovascular: no chest pain, no intermittent leg claudication, no lower extremity edema, no palpitations and no syncope.     Respiratory: no cough, no shortness of breath during exertion, no shortness of breath at rest and no wheezing.     Gastrointestinal: no abdominal pain, no constipation, no blood in stools, no diarrhea, no melena, no nausea, no rectal pain and no vomiting.     Genitourinary: no dysuria, no change in urinary frequency, no urinary hesitancy and no feelings of urinary urgency.     Musculoskeletal: no arthralgias, no back pain and no myalgias.     Integumentary: no new skin lesions and no rashes.     Neurological: no difficulty walking, no headache, no limb weakness, no numbness and no tingling.     Psychiatric: no anxiety, no depression, no anhedonia and no substance use disorders.     Endocrine: no recent weight gain and no recent weight loss.     Hematologic/Lymphatic: no tendency for easy bruising and no swollen glands.          All other systems have been reviewed and are negative for complaint.      Objective   Vitals:  /73   Pulse 83   Ht 1.88 m (6' 2\")   Wt 111 kg (245 lb)   SpO2 94%   BMI 31.46 kg/m²       Physical Exam  Constitutional: Alert and in no acute distress. Well developed, well nourished.     Eyes: Normal external exam. Pupils were equal in size, round, reactive to light (PERRL) with normal accommodation and extraocular movements intact (EOMI).     Ears, Nose, " Mouth, and Throat: External inspection of ears and nose: Normal.  Otoscopic examination: Normal.      Neck: No neck mass was observed. Supple.     Cardiovascular: Heart rate and rhythm were normal, normal S1 and S2, no gallops, no murmurs and no pericardial rub    Pulmonary: No respiratory distress. Clear bilateral breath sounds.     Abdomen: Soft nontender; no abdominal mass palpated. No organomegaly.     Musculoskeletal: No joint swelling seen, normal movements of all extremities. Range of motion: Normal.  Muscle strength/tone: Normal.        Neurologic: Deep tendon reflexes were 2+ and symmetric. Sensation: Normal.     Psychiatric: Judgment and insight: Intact. Mood and affect: Normal.        Assessment/Plan   Problem List Items Addressed This Visit       Benign essential hypertension    Relevant Medications    amLODIPine (Norvasc) 10 mg tablet    CKD (chronic kidney disease) stage 3, GFR 30-59 ml/min (Multi)    Relevant Medications    atorvastatin (Lipitor) 10 mg tablet    amLODIPine (Norvasc) 10 mg tablet    Other Relevant Orders    CBC    Comprehensive Metabolic Panel    Dementia associated with alcoholism without behavioral disturbance    Hyperlipidemia    Relevant Medications    atorvastatin (Lipitor) 10 mg tablet    Other Relevant Orders    Lipid Panel    TSH with reflex to Free T4 if abnormal    Mucopurulent chronic bronchitis (Multi)    Relevant Medications    albuterol 2.5 mg /3 mL (0.083 %) nebulizer solution     Other Visit Diagnoses         Medicare annual wellness visit, subsequent    -  Primary    Relevant Orders    Hemoglobin A1C      Prediabetes          Primary hypertension        Relevant Medications    amLODIPine (Norvasc) 10 mg tablet      Alzheimer's disease, unspecified (CODE)          Bilateral hip pain        Relevant Orders    XR hips bilateral 2 VW w pelvis when performed      Tinea corporis        Relevant Medications    clotrimazole-betamethasone (Lotrisone) cream             84 y/o  male with h/o alcohol abuse (sober since june ?2017) , HTN, HPL, alcohol induced dementia , former smoker with 40 pk year history  ( quit in his 40s ) , h/o  nasal septoplasty and b/l inf turbinate resection     Is here with his wife      MMSE 25/30 today  Chronic medical conditions: Reviewed , assessed , stable.  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 ROS  neg for heart failure - no pedal edema , echo 2023 reviewed - hfpef   Comfortable at RA with no increased WOB   94%on RA      RTO in 6m or sooner if needed              Influenza: influenza vaccine  , in the fall   Pneumovax 23/Prevnar 15: Pneumovax 23/Prevnar 15 vaccine was previously given.   Prevnar 20: Prevnar 20 vaccine was previously given.   Shingles Vaccine: Shingles vaccine was previously given.   Prostate cancer screening: Screening  ordered  Colorectal Cancer Screening: screening not indicated.   Abdominal Aortic Aneurysm screening: screening not indicated.   HIV screening: screening not indicated    RTO in  6m or sooner if needed . Labs to be done few days prior to the next visit.      For the sake of billing , entire note needs to be taken into consideration. Repetition of meds is avoided for the sake of redundancy. Med list above reflects reconciled meds. Management of abnormal results may not always result in an addendum to the note , an annotation at the end of result or communication via pt portal is to be considered .    This note is intended for the physician writing it, as well as to communicate findings to other healthcare professionals. These notes use medical lexicon that may be misunderstood by non medical persons. Therefore, interpretations of medical notes and terminology should be approached  with caution.

## 2025-08-06 LAB
ALBUMIN SERPL-MCNC: 4.2 G/DL (ref 3.6–5.1)
ALP SERPL-CCNC: 55 U/L (ref 35–144)
ALT SERPL-CCNC: 13 U/L (ref 9–46)
ANION GAP SERPL CALCULATED.4IONS-SCNC: 9 MMOL/L (CALC) (ref 7–17)
AST SERPL-CCNC: 14 U/L (ref 10–35)
BILIRUB SERPL-MCNC: 1.2 MG/DL (ref 0.2–1.2)
BUN SERPL-MCNC: 11 MG/DL (ref 7–25)
CALCIUM SERPL-MCNC: 9.6 MG/DL (ref 8.6–10.3)
CHLORIDE SERPL-SCNC: 102 MMOL/L (ref 98–110)
CHOLEST SERPL-MCNC: 163 MG/DL
CHOLEST/HDLC SERPL: 4.2 (CALC)
CO2 SERPL-SCNC: 29 MMOL/L (ref 20–32)
CREAT SERPL-MCNC: 1.96 MG/DL (ref 0.7–1.22)
EGFRCR SERPLBLD CKD-EPI 2021: 33 ML/MIN/1.73M2
ERYTHROCYTE [DISTWIDTH] IN BLOOD BY AUTOMATED COUNT: 16.7 % (ref 11–15)
EST. AVERAGE GLUCOSE BLD GHB EST-MCNC: 131 MG/DL
EST. AVERAGE GLUCOSE BLD GHB EST-SCNC: 7.3 MMOL/L
GLUCOSE SERPL-MCNC: 88 MG/DL (ref 65–99)
HBA1C MFR BLD: 6.2 %
HCT VFR BLD AUTO: 51.9 % (ref 38.5–50)
HDLC SERPL-MCNC: 39 MG/DL
HGB BLD-MCNC: 16.7 G/DL (ref 13.2–17.1)
LDLC SERPL CALC-MCNC: 102 MG/DL (CALC)
MCH RBC QN AUTO: 27.9 PG (ref 27–33)
MCHC RBC AUTO-ENTMCNC: 32.2 G/DL (ref 32–36)
MCV RBC AUTO: 86.8 FL (ref 80–100)
NONHDLC SERPL-MCNC: 124 MG/DL (CALC)
PLATELET # BLD AUTO: 297 THOUSAND/UL (ref 140–400)
PMV BLD REES-ECKER: 9.4 FL (ref 7.5–12.5)
POTASSIUM SERPL-SCNC: 4.7 MMOL/L (ref 3.5–5.3)
PROT SERPL-MCNC: 7.5 G/DL (ref 6.1–8.1)
RBC # BLD AUTO: 5.98 MILLION/UL (ref 4.2–5.8)
SODIUM SERPL-SCNC: 140 MMOL/L (ref 135–146)
TRIGL SERPL-MCNC: 127 MG/DL
TSH SERPL-ACNC: 1.43 MIU/L (ref 0.4–4.5)
WBC # BLD AUTO: 6.7 THOUSAND/UL (ref 3.8–10.8)

## 2025-09-16 ENCOUNTER — APPOINTMENT (OUTPATIENT)
Dept: PODIATRY | Facility: CLINIC | Age: 85
End: 2025-09-16
Payer: MEDICARE

## 2025-11-19 ENCOUNTER — APPOINTMENT (OUTPATIENT)
Dept: DERMATOLOGY | Facility: CLINIC | Age: 85
End: 2025-11-19
Payer: MEDICARE

## 2026-06-19 ENCOUNTER — APPOINTMENT (OUTPATIENT)
Dept: UROLOGY | Facility: CLINIC | Age: 86
End: 2026-06-19
Payer: MEDICARE